# Patient Record
Sex: MALE | Race: WHITE | NOT HISPANIC OR LATINO | Employment: OTHER | URBAN - METROPOLITAN AREA
[De-identification: names, ages, dates, MRNs, and addresses within clinical notes are randomized per-mention and may not be internally consistent; named-entity substitution may affect disease eponyms.]

---

## 2018-04-12 ENCOUNTER — OFFICE VISIT (OUTPATIENT)
Dept: MEDICAL GROUP | Age: 53
End: 2018-04-12
Payer: COMMERCIAL

## 2018-04-12 ENCOUNTER — HOSPITAL ENCOUNTER (OUTPATIENT)
Dept: LAB | Facility: MEDICAL CENTER | Age: 53
End: 2018-04-12
Attending: FAMILY MEDICINE
Payer: COMMERCIAL

## 2018-04-12 VITALS
HEART RATE: 55 BPM | TEMPERATURE: 97 F | WEIGHT: 199 LBS | OXYGEN SATURATION: 99 % | BODY MASS INDEX: 28.49 KG/M2 | SYSTOLIC BLOOD PRESSURE: 133 MMHG | DIASTOLIC BLOOD PRESSURE: 98 MMHG | HEIGHT: 70 IN

## 2018-04-12 DIAGNOSIS — I10 ESSENTIAL HYPERTENSION: ICD-10-CM

## 2018-04-12 DIAGNOSIS — Z00.00 PREVENTATIVE HEALTH CARE: ICD-10-CM

## 2018-04-12 DIAGNOSIS — E78.00 PURE HYPERCHOLESTEROLEMIA: ICD-10-CM

## 2018-04-12 LAB
ALBUMIN SERPL BCP-MCNC: 4.1 G/DL (ref 3.2–4.9)
ALBUMIN/GLOB SERPL: 1.6 G/DL
ALP SERPL-CCNC: 30 U/L (ref 30–99)
ALT SERPL-CCNC: 18 U/L (ref 2–50)
ANION GAP SERPL CALC-SCNC: 6 MMOL/L (ref 0–11.9)
AST SERPL-CCNC: 20 U/L (ref 12–45)
BASOPHILS # BLD AUTO: 0.8 % (ref 0–1.8)
BASOPHILS # BLD: 0.04 K/UL (ref 0–0.12)
BILIRUB SERPL-MCNC: 0.6 MG/DL (ref 0.1–1.5)
BUN SERPL-MCNC: 17 MG/DL (ref 8–22)
CALCIUM SERPL-MCNC: 8.8 MG/DL (ref 8.5–10.5)
CHLORIDE SERPL-SCNC: 111 MMOL/L (ref 96–112)
CHOLEST SERPL-MCNC: 122 MG/DL (ref 100–199)
CO2 SERPL-SCNC: 24 MMOL/L (ref 20–33)
CREAT SERPL-MCNC: 1.1 MG/DL (ref 0.5–1.4)
EOSINOPHIL # BLD AUTO: 0.1 K/UL (ref 0–0.51)
EOSINOPHIL NFR BLD: 1.9 % (ref 0–6.9)
ERYTHROCYTE [DISTWIDTH] IN BLOOD BY AUTOMATED COUNT: 46.6 FL (ref 35.9–50)
GLOBULIN SER CALC-MCNC: 2.6 G/DL (ref 1.9–3.5)
GLUCOSE SERPL-MCNC: 111 MG/DL (ref 65–99)
HCT VFR BLD AUTO: 45.7 % (ref 42–52)
HDLC SERPL-MCNC: 60 MG/DL
HGB BLD-MCNC: 14.9 G/DL (ref 14–18)
IMM GRANULOCYTES # BLD AUTO: 0.01 K/UL (ref 0–0.11)
IMM GRANULOCYTES NFR BLD AUTO: 0.2 % (ref 0–0.9)
LDLC SERPL CALC-MCNC: 41 MG/DL
LYMPHOCYTES # BLD AUTO: 1.07 K/UL (ref 1–4.8)
LYMPHOCYTES NFR BLD: 20.2 % (ref 22–41)
MCH RBC QN AUTO: 31.2 PG (ref 27–33)
MCHC RBC AUTO-ENTMCNC: 32.6 G/DL (ref 33.7–35.3)
MCV RBC AUTO: 95.8 FL (ref 81.4–97.8)
MONOCYTES # BLD AUTO: 0.47 K/UL (ref 0–0.85)
MONOCYTES NFR BLD AUTO: 8.9 % (ref 0–13.4)
NEUTROPHILS # BLD AUTO: 3.62 K/UL (ref 1.82–7.42)
NEUTROPHILS NFR BLD: 68 % (ref 44–72)
NRBC # BLD AUTO: 0 K/UL
NRBC BLD-RTO: 0 /100 WBC
PLATELET # BLD AUTO: 191 K/UL (ref 164–446)
PMV BLD AUTO: 12 FL (ref 9–12.9)
POTASSIUM SERPL-SCNC: 3.9 MMOL/L (ref 3.6–5.5)
PROT SERPL-MCNC: 6.7 G/DL (ref 6–8.2)
RBC # BLD AUTO: 4.77 M/UL (ref 4.7–6.1)
SODIUM SERPL-SCNC: 141 MMOL/L (ref 135–145)
TRIGL SERPL-MCNC: 104 MG/DL (ref 0–149)
WBC # BLD AUTO: 5.3 K/UL (ref 4.8–10.8)

## 2018-04-12 PROCEDURE — 80061 LIPID PANEL: CPT

## 2018-04-12 PROCEDURE — 36415 COLL VENOUS BLD VENIPUNCTURE: CPT

## 2018-04-12 PROCEDURE — 80053 COMPREHEN METABOLIC PANEL: CPT

## 2018-04-12 PROCEDURE — 85025 COMPLETE CBC W/AUTO DIFF WBC: CPT

## 2018-04-12 PROCEDURE — 99203 OFFICE O/P NEW LOW 30 MIN: CPT | Performed by: FAMILY MEDICINE

## 2018-04-12 RX ORDER — ATORVASTATIN CALCIUM 40 MG/1
40 TABLET, FILM COATED ORAL DAILY
Qty: 90 TAB | Refills: 0 | Status: SHIPPED | OUTPATIENT
Start: 2018-04-12 | End: 2018-10-12

## 2018-04-12 RX ORDER — ATENOLOL 50 MG/1
50 TABLET ORAL DAILY
COMMUNITY
End: 2018-04-13 | Stop reason: SDUPTHER

## 2018-04-12 RX ORDER — FENOFIBRATE 145 MG/1
145 TABLET, COATED ORAL
Qty: 90 TAB | Refills: 0 | Status: SHIPPED | OUTPATIENT
Start: 2018-04-12 | End: 2018-07-09 | Stop reason: SDUPTHER

## 2018-04-12 RX ORDER — ATORVASTATIN CALCIUM 40 MG/1
40 TABLET, FILM COATED ORAL DAILY
Qty: 90 TAB | Refills: 0 | Status: SHIPPED | OUTPATIENT
Start: 2018-04-12 | End: 2018-04-12 | Stop reason: SDUPTHER

## 2018-04-12 RX ORDER — ATORVASTATIN CALCIUM 40 MG/1
40 TABLET, FILM COATED ORAL NIGHTLY
COMMUNITY
End: 2018-04-12 | Stop reason: SDUPTHER

## 2018-04-12 ASSESSMENT — PATIENT HEALTH QUESTIONNAIRE - PHQ9: CLINICAL INTERPRETATION OF PHQ2 SCORE: 0

## 2018-04-12 NOTE — ASSESSMENT & PLAN NOTE
Fenofibrate 145mg  Atorvastatin 40mg po q24h    The patient has been on a statin for years and tolerating this fine. The patient denies any muscle aches, no abdominal pain and no history of elevated liver enzymes.

## 2018-04-12 NOTE — ASSESSMENT & PLAN NOTE
The patient is a 53-year-old male presents to clinic to establish care. He has a significant past medical history of hypertension, hyperlipidemia. He takes fenofibrate, atorvastatin, atenolol, triamterene/Hctz however he's been out of these medications for at least 2 weeks.   The patient denied any chest pain, no sob, no qiu, no  pnd, no orthopnea, no headache, no changes in vision, no numbness or tingling, no nausea, no diarrhea, no abdominal pain, no fevers, no chills, no bright red blood per rectum, no  difficulty urinating, no burning during micturition, no depressed mood, no other concerns.    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

## 2018-04-12 NOTE — ASSESSMENT & PLAN NOTE
Has not had his medications for 2 wks  Triameterent/hctz 50/25mg po 24h  Atenolol 50mg po q24h          Patient has been diagnosed with essential hypertension for years. Has been compliant with medications and tolerating them with no mention of any adverse events.  The patient is NOT on a Baby ASPIRIN NOR a  STATIN.  The patient has been tollerating the BP meds without any issues,also monitors BP at home. No tunnel vision, no cough, no changes in vision, no lightheadedness, no fatigue, no syncopal or presyncopal episodes, no edema, no new rashes. Patient also  denied chest pain, headache, change in vision, dyspnea on exertion, shortness of breath, PND, back pain, numbness or tingling anywhere. He is tolerating his medication well, he denies any lightheadedness, no tunnel vision, no syncopal episodes, no fatigue, no leg weakness no falls.

## 2018-04-12 NOTE — PROGRESS NOTES
This medical record contains text that has been entered with the assistance of computer voice recognition and dictation software.  Therefore, it may contain unintended errors in text, spelling, punctuation, or grammar    Chief Complaint   Patient presents with   • Establish Care   • Hypertension       Sony Tenorio is a 53 y.o. male here evaluation and management of: establish care, htn, hld      HPI:     Preventative health care    The patient is a 53-year-old male presents to clinic to [facility]. He has a significant past medical history of hypertension, hyperlipidemia. He takes fenofibrate, atorvastatin, atenolol, triamterene/Hctz however he's been out of these medications for at least 2 weeks.   The patient denied any chest pain, no sob, no qiu, no  pnd, no orthopnea, no headache, no changes in vision, no numbness or tingling, no nausea, no diarrhea, no abdominal pain, no fevers, no chills, no bright red blood per rectum, no  difficulty urinating, no burning during micturition, no depressed mood, no other concerns.    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 Has been compliant with medications and tolerating them with no mention of any adverse events.  The patient is NOT on a Baby ASPIRIN NOR a  STATIN.  The patient has been tollerating the BP meds without any issues,also monitors BP at home. No tunnel vision, no cough, no changes in vision, no lightheadedness, no fatigue, no syncopal or presyncopal episodes, no edema, no new rashes. Patient also  denied chest pain, headache, change in vision, dyspnea on exertion, shortness of breath, PND, back pain, numbness or tingling anywhere. He is tolerating his medication well, he denies any  "lightheadedness, no tunnel vision, no syncopal episodes, no fatigue, no leg weakness no falls.    Pure hypercholesterolemia  Fenofibrate 145mg  Atorvastatin 40mg po q24h    The patient has been on a statin for years and tolerating this fine. The patient denies any muscle aches, no abdominal pain and no history of elevated liver enzymes.      Current medicines (including changes today)  Current Outpatient Prescriptions   Medication Sig Dispense Refill   • triamterene/hctz 50-25 MG Cap Take 1 Cap by mouth every morning. 90 Cap 0   • atorvastatin (LIPITOR) 40 MG Tab Take 1 Tab by mouth every day. 90 Tab 0   • fenofibrate (TRICOR) 145 MG Tab Take 1 Tab by mouth every bedtime. 90 Tab 0   • atenolol (TENORMIN) 50 MG Tab Take 50 mg by mouth every day.       No current facility-administered medications for this visit.      He  has no past medical history on file.  He  has no past surgical history on file.  Social History   Substance Use Topics   • Smoking status: Never Smoker   • Smokeless tobacco: Former User     Quit date: 2016   • Alcohol use Not on file     Social History     Social History Narrative   • No narrative on file     No family history on file.  No family status information on file.         ROS    Please see hpi     All other systems reviewed and are negative     Objective:     Blood pressure 133/98, pulse (!) 55, temperature 36.1 °C (97 °F), height 1.778 m (5' 10\"), weight 90.3 kg (199 lb), SpO2 99 %. Body mass index is 28.55 kg/m².  Physical Exam:    Constitutional: Alert, no distress.  Skin: Warm, dry, good turgor, no rashes in visible areas.  Eye: Equal, round and reactive, conjunctiva clear, lids normal.  ENMT: Lips without lesions, good dentition, oropharynx clear.  Neck: Trachea midline, no masses, no thyromegaly. No cervical or supraclavicular lymphadenopathy.  Respiratory: Unlabored respiratory effort, lungs clear to auscultation, no wheezes, no ronchi.  Cardiovascular: Normal S1, S2, no murmur, no " edema.  Abdomen: Soft, non-tender, no masses, no hepatosplenomegaly.  Psych: Alert and oriented x3, normal affect and mood.          Assessment and Plan:   The following treatment plan was discussed      1. Pure hypercholesterolemia  Need new labs  Then make adjustments    2. Essential hypertension    I explained to the patient that the most common cause of death in [place] in both Male and Females was Acute MI and the most common risk factor for MI was hypertension.  The Patient was counseled on aggressive life style modifications such as running at least 20minutes per day 3 times per wk, and decreasing Sodium intake,      In addition to pharmacotherapy we discussed  lifestyle modification…#1. Maintain normal weight (BMI 18.5 to 24.kg/m2), #2 DASH diet, #3 Decrease Sodium intake to less than 100meq/day (2.4g Na or 6g NaCL), #4 increase physical activity, #5 Limit Etoh consumption to 2 drinks/day in men and 1 drink per day in women.   - LIPID PROFILE; Future  - COMP METABOLIC PANEL; Future  - CBC WITH DIFFERENTIAL; Future    meds were ordered and sent to pharmacy    3. Preventative health care  Care has been established  We need baseline labs to establish a clinical profile  We will then consider daily prophylactic aspirin   In order to weigh  the benefits vs risk of GI bleed    We reviewed USPSTF guidelines  The USPSTF recommends initiating low-dose aspirin use for the primary prevention of cardiovascular disease (CVD) and colorectal cancer (CRC) in adults aged 50 to 59 years who have a 10% or greater 10-year CVD risk, are not at increased risk for bleeding, have a life expectancy of at least 10 years, and are willing to take low-dose aspirin daily for at least 10 years.        Up to date on colonoscopy   Requested Medical records to be sent to us  Denies intimate partner viloence            HEALTH MAINTENANCE:    Instructed to Follow up in clinic or ER for worsening symptoms, difficulty breathing, lack of expected  recovery, or should new symptoms or problems arise.    Followup: Return in about 2 weeks (around 4/26/2018) for 2 week BP log.       Once again this medical record contains text that has been entered with the assistance of computer voice recognition and dictation software.  Therefore, it may contain unintended errors in text, spelling, punctuation, or grammar

## 2018-04-17 RX ORDER — ATENOLOL 50 MG/1
50 TABLET ORAL DAILY
Qty: 90 TAB | Refills: 0 | Status: SHIPPED | OUTPATIENT
Start: 2018-04-17 | End: 2018-07-13 | Stop reason: SDUPTHER

## 2018-04-19 ENCOUNTER — TELEPHONE (OUTPATIENT)
Dept: MEDICAL GROUP | Age: 53
End: 2018-04-19

## 2018-04-19 NOTE — TELEPHONE ENCOUNTER
1. Caller Name: [facility]                                          Call Back Number: [phone]      Patient approves a detailed voicemail message: N\A    Pharmacy stated Pt has been taking a dose of Triamterene of 37.5-25mg.  During intake, Pt was unsure about dosage.

## 2018-07-13 RX ORDER — ATENOLOL 50 MG/1
50 TABLET ORAL DAILY
Qty: 90 TAB | Refills: 0 | Status: SHIPPED | OUTPATIENT
Start: 2018-07-13 | End: 2018-07-18 | Stop reason: SDUPTHER

## 2018-07-17 RX ORDER — TRIAMTERENE AND HYDROCHLOROTHIAZIDE 37.5; 25 MG/1; MG/1
CAPSULE ORAL
Qty: 90 CAP | Refills: 0 | Status: SHIPPED | OUTPATIENT
Start: 2018-07-17 | End: 2018-10-14 | Stop reason: SDUPTHER

## 2018-07-19 RX ORDER — ATENOLOL 50 MG/1
50 TABLET ORAL DAILY
Qty: 90 TAB | Refills: 0 | Status: SHIPPED | OUTPATIENT
Start: 2018-07-19 | End: 2019-01-10 | Stop reason: SDUPTHER

## 2018-10-12 ENCOUNTER — HOSPITAL ENCOUNTER (OUTPATIENT)
Dept: LAB | Facility: MEDICAL CENTER | Age: 53
End: 2018-10-12
Attending: FAMILY MEDICINE
Payer: COMMERCIAL

## 2018-10-12 ENCOUNTER — OFFICE VISIT (OUTPATIENT)
Dept: MEDICAL GROUP | Age: 53
End: 2018-10-12
Payer: COMMERCIAL

## 2018-10-12 VITALS
HEART RATE: 50 BPM | DIASTOLIC BLOOD PRESSURE: 78 MMHG | TEMPERATURE: 97.4 F | BODY MASS INDEX: 26.63 KG/M2 | WEIGHT: 186 LBS | OXYGEN SATURATION: 98 % | SYSTOLIC BLOOD PRESSURE: 136 MMHG | HEIGHT: 70 IN

## 2018-10-12 DIAGNOSIS — I10 ESSENTIAL HYPERTENSION: ICD-10-CM

## 2018-10-12 DIAGNOSIS — Z00.00 PREVENTATIVE HEALTH CARE: ICD-10-CM

## 2018-10-12 DIAGNOSIS — Z12.11 SCREENING FOR COLON CANCER: ICD-10-CM

## 2018-10-12 DIAGNOSIS — H61.23 BILATERAL IMPACTED CERUMEN: ICD-10-CM

## 2018-10-12 DIAGNOSIS — E78.00 PURE HYPERCHOLESTEROLEMIA: ICD-10-CM

## 2018-10-12 LAB
ALBUMIN SERPL BCP-MCNC: 4.5 G/DL (ref 3.2–4.9)
ALBUMIN/GLOB SERPL: 1.6 G/DL
ALP SERPL-CCNC: 29 U/L (ref 30–99)
ALT SERPL-CCNC: 22 U/L (ref 2–50)
ANION GAP SERPL CALC-SCNC: 8 MMOL/L (ref 0–11.9)
AST SERPL-CCNC: 26 U/L (ref 12–45)
BILIRUB SERPL-MCNC: 0.6 MG/DL (ref 0.1–1.5)
BUN SERPL-MCNC: 23 MG/DL (ref 8–22)
CALCIUM SERPL-MCNC: 9 MG/DL (ref 8.5–10.5)
CHLORIDE SERPL-SCNC: 110 MMOL/L (ref 96–112)
CHOLEST SERPL-MCNC: 146 MG/DL (ref 100–199)
CO2 SERPL-SCNC: 25 MMOL/L (ref 20–33)
CREAT SERPL-MCNC: 1.17 MG/DL (ref 0.5–1.4)
ERYTHROCYTE [DISTWIDTH] IN BLOOD BY AUTOMATED COUNT: 45.9 FL (ref 35.9–50)
FASTING STATUS PATIENT QL REPORTED: NORMAL
GLOBULIN SER CALC-MCNC: 2.8 G/DL (ref 1.9–3.5)
GLUCOSE SERPL-MCNC: 109 MG/DL (ref 65–99)
HCT VFR BLD AUTO: 48 % (ref 42–52)
HDLC SERPL-MCNC: 79 MG/DL
HGB BLD-MCNC: 15.6 G/DL (ref 14–18)
LDLC SERPL CALC-MCNC: 56 MG/DL
MCH RBC QN AUTO: 30.8 PG (ref 27–33)
MCHC RBC AUTO-ENTMCNC: 32.5 G/DL (ref 33.7–35.3)
MCV RBC AUTO: 94.7 FL (ref 81.4–97.8)
PLATELET # BLD AUTO: 189 K/UL (ref 164–446)
PMV BLD AUTO: 11.5 FL (ref 9–12.9)
POTASSIUM SERPL-SCNC: 3.6 MMOL/L (ref 3.6–5.5)
PROT SERPL-MCNC: 7.3 G/DL (ref 6–8.2)
RBC # BLD AUTO: 5.07 M/UL (ref 4.7–6.1)
SODIUM SERPL-SCNC: 143 MMOL/L (ref 135–145)
TRIGL SERPL-MCNC: 54 MG/DL (ref 0–149)
WBC # BLD AUTO: 5.3 K/UL (ref 4.8–10.8)

## 2018-10-12 PROCEDURE — 90471 IMMUNIZATION ADMIN: CPT | Performed by: FAMILY MEDICINE

## 2018-10-12 PROCEDURE — 85027 COMPLETE CBC AUTOMATED: CPT

## 2018-10-12 PROCEDURE — 90686 IIV4 VACC NO PRSV 0.5 ML IM: CPT | Performed by: FAMILY MEDICINE

## 2018-10-12 PROCEDURE — 80053 COMPREHEN METABOLIC PANEL: CPT

## 2018-10-12 PROCEDURE — 36415 COLL VENOUS BLD VENIPUNCTURE: CPT

## 2018-10-12 PROCEDURE — 99396 PREV VISIT EST AGE 40-64: CPT | Mod: 25 | Performed by: FAMILY MEDICINE

## 2018-10-12 PROCEDURE — 80061 LIPID PANEL: CPT

## 2018-10-12 NOTE — LETTER
[facility]  Dannie Mullins M.D.  [address] [place]   [place] [address]  Fax: [phone]   Authorization for Release/Disclosure of   Protected Health Information   Name: SONY TENORIO : 1965 SSN: [id number]   Address: [address] [place] [address]  [place] [address] Phone:    [phone] (home)    I authorize the entity listed below to release/disclose the PHI below to:   [facility]/Dannie Mullins M.D. and Dannie Mullins M.D.   Provider or Entity Name:  [facility]   Address   City, State, Zip   Phone:      Fax:     Reason for request: continuity of care   Information to be released:    [  ] LAST COLONOSCOPY,  including any PATH REPORT and follow-up  [  ] LAST FIT/COLOGUARD RESULT [  ] LAST DEXA  [  ] LAST MAMMOGRAM  [  ] LAST PAP  [  ] LAST LABS [  ] RETINA EXAM REPORT  [  ] IMMUNIZATION RECORDS  [  ] Release all info      [  ] Check here and initial the line next to each item to release ALL health information INCLUDING  _____ Care and treatment for drug and / or alcohol abuse  _____ HIV testing, infection status, or AIDS  _____ Genetic Testing    DATES OF SERVICE OR TIME PERIOD TO BE DISCLOSED: _____________  I understand and acknowledge that:  * This Authorization may be revoked at any time by you in writing, except if your health information has already been used or disclosed.  * Your health information that will be used or disclosed as a result of you signing this authorization could be re-disclosed by the recipient. If this occurs, your re-disclosed health information may no longer be protected by State or Federal laws.  * You may refuse to sign this Authorization. Your refusal will not affect your ability to obtain treatment.  * This Authorization becomes effective upon signing and will  on (date) __________.      If no date is indicated, this Authorization will  one (1) year from the signature date.    Name: Sony Tenorio    Signature:   Date:     10/12/2018       PLEASE FAX REQUESTED RECORDS BACK TO: [phone]

## 2018-10-12 NOTE — ASSESSMENT & PLAN NOTE
The patient is a very pleasant 53-year-old male who returns to clinic for routine follow-up.  He has a significant past medical history of hypertension hyperlipidemia currently is compliant with medications.  He has lost 13 pounds by increasing the mileage of bike riding.  The patient denied any chest pain, no sob, no qiu, no  pnd, no orthopnea, no headache, no changes in vision, no numbness or tingling, no nausea, no diarrhea, no abdominal pain, no fevers, no chills, no bright red blood per rectum, no  difficulty urinating, no burning during micturition, no depressed mood, no other concerns.

## 2018-10-12 NOTE — ASSESSMENT & PLAN NOTE
Atorvastatin 40 mg p.o. nightly  Fenofibrate 145 mg p.o. nightly    Patient denies any abdominal pain no history of elevated liver enzymes denies any muscle aches.  He is due for new labs.

## 2018-10-12 NOTE — ASSESSMENT & PLAN NOTE
Triamterene/HCTZ 37.5 mg / 25 mg p.o. Daily    The patient has been tollerating the BP meds without any issues. No tunnel vision, no cough, no changes in vision, no lightheadedness, no fatigue, no syncopal or presyncopal episodes, no edema, no new rashes.     The patient also denies chest pain, no dyspnea on exertion, no headaches, no numbness or tingling, no changes in vision, no weakness in any extremity, no back pain no changes in micturition.

## 2018-10-12 NOTE — PROGRESS NOTES
This medical record contains text that has been entered with the assistance of computer voice recognition and dictation software.  Therefore, it may contain unintended errors in text, spelling, punctuation, or grammar    Chief Complaint   Patient presents with   • Follow-Up   • Ear Fullness     right ear, x1 year         Sony Tenorio is a 53 y.o. male here evaluation and management of: Annual visit, ear fullness      HPI:     Essential hypertension  Triamterene/HCTZ 37.5 mg / 25 mg p.o. Daily    The patient has been tollerating the BP meds without any issues. No tunnel vision, no cough, no changes in vision, no lightheadedness, no fatigue, no syncopal or presyncopal episodes, no edema, no new rashes.     The patient also denies chest pain, no dyspnea on exertion, no headaches, no numbness or tingling, no changes in vision, no weakness in any extremity, no back pain no changes in micturition.          Bilateral impacted cerumen  NEW PROBLEM    Patient states she has been noticing some more right ear stuffiness lately.  Denies any hearing loss denies any tinnitus no discharge from the ear no fevers no chills no headaches no ear pain.    Pure hypercholesterolemia  Atorvastatin 40 mg p.o. nightly  Fenofibrate 145 mg p.o. nightly    Patient denies any abdominal pain no history of elevated liver enzymes denies any muscle aches.  He is due for new labs.    Screening for colon cancer  Patient states that he has completed his colonoscopy the records are pending.    Preventative health care  The patient is a very pleasant 53-year-old male who returns to clinic for routine follow-up.  He has a significant past medical history of hypertension hyperlipidemia currently is compliant with medications.  He has lost 13 pounds by increasing the mileage of bike riding.  The patient denied any chest pain, no sob, no qiu, no  pnd, no orthopnea, no headache, no changes in vision, no numbness or tingling, no nausea, no diarrhea, no  "abdominal pain, no fevers, no chills, no bright red blood per rectum, no  difficulty urinating, no burning during micturition, no depressed mood, no other concerns.                Current medicines (including changes today)  Current Outpatient Prescriptions   Medication Sig Dispense Refill   • aspirin EC (ECOTRIN) 81 MG Tablet Delayed Response Take 1 Tab by mouth every day. 365 Tab 1   • carbamide peroxide (CARBAMOXIDE EAR DROPS) 6.5 % Solution Place 6 Drops in ear 2 times a day. Administer drops in both ears. 1 Bottle 0   • atenolol (TENORMIN) 50 MG Tab TAKE 1 TAB BY MOUTH EVERY DAY. 90 Tab 0   • triamterene/hctz (MAXZIDE-25/DYAZIDE) 37.5-25 MG Cap TAKE ONE CAPSULE BY MOUTH EVERY MORNING 90 Cap 0   • atorvastatin (LIPITOR) 40 MG Tab TAKE 1 TAB BY MOUTH EVERY DAY. 90 Tab 0   • fenofibrate (TRICOR) 145 MG Tab TAKE 1 TAB BY MOUTH EVERY BEDTIME. 90 Tab 0     No current facility-administered medications for this visit.      He  has no past medical history on file.  He  has no past surgical history on file.  Social History   Substance Use Topics   • Smoking status: Never Smoker   • Smokeless tobacco: Former User     Quit date: 2016   • Alcohol use Not on file     Social History     Social History Narrative   • No narrative on file     No family history on file.  No family status information on file.         ROS    Please see hpi     All other systems reviewed and are negative     Objective:     Blood pressure 136/78, pulse (!) 50, temperature 36.3 °C (97.4 °F), temperature source Temporal, height 1.778 m (5' 10\"), weight 84.4 kg (186 lb), SpO2 98 %. Body mass index is 26.69 kg/m².  Physical Exam:    Constitutional: Alert, no distress.  Skin: Warm, dry, good turgor, no rashes in visible areas.  Eye: Equal, round and reactive, conjunctiva clear, lids normal.  ENMT: Lips without lesions, good dentition, oropharynx clear.  Neck: Trachea midline, no masses, no thyromegaly. No cervical or supraclavicular " lymphadenopathy.  Respiratory: Unlabored respiratory effort, lungs clear to auscultation, no wheezes, no ronchi.  Cardiovascular: Normal S1, S2, no murmur, no edema.  Abdomen: Soft, non-tender, no masses, no hepatosplenomegaly.  Psych: Alert and oriented x3, normal affect and mood.          Assessment and Plan:   The following treatment plan was discussed      1. Essential hypertension    Patient has been stable with current management  We will make no changes for now    - LIPID PROFILE; Future  - CBC WITHOUT DIFFERENTIAL; Future  - COMP METABOLIC PANEL; Future  - aspirin EC (ECOTRIN) 81 MG Tablet Delayed Response; Take 1 Tab by mouth every day.  Dispense: 365 Tab; Refill: 1  - Influenza Vaccine Quad Injection >3Y (PF)    2. Screening for colon cancer  Records pending      3. Bilateral impacted cerumen    - carbamide peroxide (CARBAMOXIDE EAR DROPS) 6.5 % Solution; Place 6 Drops in ear 2 times a day. Administer drops in both ears.  Dispense: 1 Bottle; Refill: 0    4. Pure hypercholesterolemia  Due for new labs    5. Preventative health care  Care has been established  We need baseline labs to establish a clinical profile  We will then consider daily prophylactic aspirin   In order to weigh  the benefits vs risk of GI bleed    We reviewed USPSTF guidelines  The USPSTF recommends initiating low-dose aspirin use for the primary prevention of cardiovascular disease (CVD) and colorectal cancer (CRC) in adults aged 50 to 59 years who have a 10% or greater 10-year CVD risk, are not at increased risk for bleeding, have a life expectancy of at least 10 years, and are willing to take low-dose aspirin daily for at least 10 years.          Up to date on colonoscopy               HEALTH MAINTENANCE:    Instructed to Follow up in clinic or ER for worsening symptoms, difficulty breathing, lack of expected recovery, or should new symptoms or problems arise.    Followup: Return in about 6 months (around 4/12/2019) for  Reevaluation.       Once again this medical record contains text that has been entered with the assistance of computer voice recognition and dictation software.  Therefore, it may contain unintended errors in text, spelling, punctuation, or grammar

## 2018-10-16 ENCOUNTER — TELEPHONE (OUTPATIENT)
Dept: MEDICAL GROUP | Age: 53
End: 2018-10-16

## 2018-10-16 RX ORDER — TRIAMTERENE AND HYDROCHLOROTHIAZIDE 37.5; 25 MG/1; MG/1
CAPSULE ORAL
Qty: 90 CAP | Refills: 0 | Status: SHIPPED | OUTPATIENT
Start: 2018-10-16 | End: 2019-01-10 | Stop reason: SDUPTHER

## 2018-10-16 RX ORDER — ATORVASTATIN CALCIUM 40 MG/1
TABLET, FILM COATED ORAL
Qty: 90 TAB | Refills: 0 | Status: SHIPPED | OUTPATIENT
Start: 2018-10-16 | End: 2019-01-10 | Stop reason: SDUPTHER

## 2018-10-16 RX ORDER — FENOFIBRATE 145 MG/1
TABLET, COATED ORAL
Qty: 90 TAB | Refills: 0 | Status: SHIPPED | OUTPATIENT
Start: 2018-10-16 | End: 2019-01-14 | Stop reason: SDUPTHER

## 2018-10-18 NOTE — TELEPHONE ENCOUNTER
1. Caller Name:  [id number] - [place] - [address] [place] [address]  Phone: [phone] Fax: [phone]    Pharmacy recommends OTC Debrox solution

## 2019-01-10 RX ORDER — TRIAMTERENE AND HYDROCHLOROTHIAZIDE 37.5; 25 MG/1; MG/1
CAPSULE ORAL
Qty: 90 CAP | Refills: 0 | Status: SHIPPED | OUTPATIENT
Start: 2019-01-10 | End: 2019-04-10 | Stop reason: SDUPTHER

## 2019-01-10 RX ORDER — ATENOLOL 50 MG/1
50 TABLET ORAL DAILY
Qty: 90 TAB | Refills: 0 | Status: SHIPPED | OUTPATIENT
Start: 2019-01-10 | End: 2019-04-15 | Stop reason: SDUPTHER

## 2019-01-10 RX ORDER — ATORVASTATIN CALCIUM 40 MG/1
40 TABLET, FILM COATED ORAL
Qty: 90 TAB | Refills: 0 | Status: SHIPPED | OUTPATIENT
Start: 2019-01-10 | End: 2019-04-15 | Stop reason: SDUPTHER

## 2019-01-15 RX ORDER — FENOFIBRATE 145 MG/1
TABLET, COATED ORAL
Qty: 90 TAB | Refills: 0 | Status: SHIPPED | OUTPATIENT
Start: 2019-01-15 | End: 2019-04-10 | Stop reason: SDUPTHER

## 2019-04-11 ENCOUNTER — HOSPITAL ENCOUNTER (OUTPATIENT)
Dept: LAB | Facility: MEDICAL CENTER | Age: 54
End: 2019-04-11
Attending: FAMILY MEDICINE
Payer: COMMERCIAL

## 2019-04-11 ENCOUNTER — OFFICE VISIT (OUTPATIENT)
Dept: MEDICAL GROUP | Age: 54
End: 2019-04-11
Payer: COMMERCIAL

## 2019-04-11 VITALS
OXYGEN SATURATION: 91 % | HEIGHT: 70 IN | SYSTOLIC BLOOD PRESSURE: 118 MMHG | DIASTOLIC BLOOD PRESSURE: 80 MMHG | WEIGHT: 188.4 LBS | TEMPERATURE: 96.7 F | BODY MASS INDEX: 26.97 KG/M2 | HEART RATE: 53 BPM

## 2019-04-11 DIAGNOSIS — E78.00 PURE HYPERCHOLESTEROLEMIA: ICD-10-CM

## 2019-04-11 DIAGNOSIS — Z00.00 PREVENTATIVE HEALTH CARE: ICD-10-CM

## 2019-04-11 DIAGNOSIS — I10 ESSENTIAL HYPERTENSION: ICD-10-CM

## 2019-04-11 LAB
ALBUMIN SERPL BCP-MCNC: 4.4 G/DL (ref 3.2–4.9)
ALBUMIN/GLOB SERPL: 1.8 G/DL
ALP SERPL-CCNC: 24 U/L (ref 30–99)
ALT SERPL-CCNC: 20 U/L (ref 2–50)
ANION GAP SERPL CALC-SCNC: 4 MMOL/L (ref 0–11.9)
AST SERPL-CCNC: 28 U/L (ref 12–45)
BILIRUB SERPL-MCNC: 0.5 MG/DL (ref 0.1–1.5)
BUN SERPL-MCNC: 22 MG/DL (ref 8–22)
CALCIUM SERPL-MCNC: 9 MG/DL (ref 8.5–10.5)
CHLORIDE SERPL-SCNC: 107 MMOL/L (ref 96–112)
CHOLEST SERPL-MCNC: 138 MG/DL (ref 100–199)
CO2 SERPL-SCNC: 26 MMOL/L (ref 20–33)
CREAT SERPL-MCNC: 1.07 MG/DL (ref 0.5–1.4)
ERYTHROCYTE [DISTWIDTH] IN BLOOD BY AUTOMATED COUNT: 47.1 FL (ref 35.9–50)
FASTING STATUS PATIENT QL REPORTED: NORMAL
GLOBULIN SER CALC-MCNC: 2.5 G/DL (ref 1.9–3.5)
GLUCOSE SERPL-MCNC: 113 MG/DL (ref 65–99)
HCT VFR BLD AUTO: 47.4 % (ref 42–52)
HDLC SERPL-MCNC: 78 MG/DL
HGB BLD-MCNC: 15.2 G/DL (ref 14–18)
LDLC SERPL CALC-MCNC: 50 MG/DL
MCH RBC QN AUTO: 31.5 PG (ref 27–33)
MCHC RBC AUTO-ENTMCNC: 32.1 G/DL (ref 33.7–35.3)
MCV RBC AUTO: 98.1 FL (ref 81.4–97.8)
PLATELET # BLD AUTO: 193 K/UL (ref 164–446)
PMV BLD AUTO: 11.3 FL (ref 9–12.9)
POTASSIUM SERPL-SCNC: 3.8 MMOL/L (ref 3.6–5.5)
PROT SERPL-MCNC: 6.9 G/DL (ref 6–8.2)
RBC # BLD AUTO: 4.83 M/UL (ref 4.7–6.1)
SODIUM SERPL-SCNC: 137 MMOL/L (ref 135–145)
TRIGL SERPL-MCNC: 51 MG/DL (ref 0–149)
WBC # BLD AUTO: 4.6 K/UL (ref 4.8–10.8)

## 2019-04-11 PROCEDURE — 80053 COMPREHEN METABOLIC PANEL: CPT

## 2019-04-11 PROCEDURE — 80061 LIPID PANEL: CPT

## 2019-04-11 PROCEDURE — 99213 OFFICE O/P EST LOW 20 MIN: CPT | Performed by: FAMILY MEDICINE

## 2019-04-11 PROCEDURE — 36415 COLL VENOUS BLD VENIPUNCTURE: CPT

## 2019-04-11 PROCEDURE — 85027 COMPLETE CBC AUTOMATED: CPT

## 2019-04-11 RX ORDER — FENOFIBRATE 145 MG/1
TABLET, COATED ORAL
Qty: 90 TAB | Refills: 0 | Status: SHIPPED | OUTPATIENT
Start: 2019-04-11 | End: 2019-07-10 | Stop reason: SDUPTHER

## 2019-04-11 RX ORDER — TRIAMTERENE AND HYDROCHLOROTHIAZIDE 37.5; 25 MG/1; MG/1
CAPSULE ORAL
Qty: 90 CAP | Refills: 0 | Status: SHIPPED | OUTPATIENT
Start: 2019-04-11 | End: 2019-07-10 | Stop reason: SDUPTHER

## 2019-04-11 NOTE — ASSESSMENT & PLAN NOTE
The patient has been compliant with his hypertensive regimen of triamterene/Hydrocort thiazide 37.5/25 mg p.o. daily  As well as atenolol 50 mg p.o. Daily    Home BP readings--- not monitoring    The patient has been tollerating the BP meds without any issues. No tunnel vision, no cough, no changes in vision, no lightheadedness, no fatigue, no syncopal or presyncopal episodes, no edema, no new rashes.     The patient also denies chest pain, no dyspnea on exertion, no headaches, no numbness or tingling, no changes in vision, no weakness in any extremity, no back pain no changes in micturition.

## 2019-04-11 NOTE — PROGRESS NOTES
This medical record contains text that has been entered with the assistance of computer voice recognition and dictation software.  Therefore, it may contain unintended errors in text, spelling, punctuation, or grammar    Chief Complaint   Patient presents with   • Hyperlipidemia     lab review         Sony Tenorio is a 54 y.o. male here evaluation and management of: routine      HPI:     Essential hypertension  The patient has been compliant with his hypertensive regimen of triamterene/Hydrocort thiazide 37.5/25 mg p.o. daily  As well as atenolol 50 mg p.o. Daily    Home BP readings--- not monitoring    The patient has been tollerating the BP meds without any issues. No tunnel vision, no cough, no changes in vision, no lightheadedness, no fatigue, no syncopal or presyncopal episodes, no edema, no new rashes.     The patient also denies chest pain, no dyspnea on exertion, no headaches, no numbness or tingling, no changes in vision, no weakness in any extremity, no back pain no changes in micturition.        Pure hypercholesterolemia  Atorvastatin 40 mg nightly  Fenofibrate 145 mg p.o. nightly    Overall has been tolerating these medications for years.  Denies any abdominal pain no nausea no vomiting no history of elevated liver enzymes.  No history of muscle aches.  He is due for new labs.    Preventative health care  The patient is a very pleasant 54-year-old male who presents to clinic for his annual wellness visit.  He has a significant past medical history of hyperlipidemia hypertension.  He is a very healthy male who bicycles 25-30 miles per day. He is the owner of a coffee shop. The patient denied any chest pain, no sob, no qiu, no  pnd, no orthopnea, no headache, no changes in vision, no numbness or tingling, no nausea, no diarrhea, no abdominal pain, no fevers, no chills, no bright red blood per rectum, no  difficulty urinating, no burning during micturition, no depressed mood, no other concerns.      Current  "medicines (including changes today)  Current Outpatient Prescriptions   Medication Sig Dispense Refill   • fenofibrate (TRICOR) 145 MG Tab TAKE 1 TABLET BY MOUTH EVERYDAY AT BEDTIME 90 Tab 0   • triamterene/hctz (MAXZIDE-25/DYAZIDE) 37.5-25 MG Cap TAKE 1 CAPSULE BY MOUTH EVERY DAY IN THE MORNING 90 Cap 0   • atenolol (TENORMIN) 50 MG Tab Take 1 Tab by mouth every day. 90 Tab 0   • atorvastatin (LIPITOR) 40 MG Tab Take 1 Tab by mouth every day. 90 Tab 0   • aspirin EC (ECOTRIN) 81 MG Tablet Delayed Response Take 1 Tab by mouth every day. 365 Tab 1   • carbamide peroxide (CARBAMOXIDE EAR DROPS) 6.5 % Solution Place 6 Drops in ear 2 times a day. Administer drops in both ears. 1 Bottle 0   • carbamide peroxide (CARBAMOXIDE EAR DROPS) 6.5 % Solution Place 6 Drops in ear 2 times a day. Administer drops in both ears. 1 Bottle 0     No current facility-administered medications for this visit.      He  has no past medical history on file.  He  has no past surgical history on file.  Social History   Substance Use Topics   • Smoking status: Never Smoker   • Smokeless tobacco: Former User     Quit date: 2016   • Alcohol use Not on file     Social History     Social History Narrative   • No narrative on file     History reviewed. No pertinent family history.  No family status information on file.         ROS    Please see hpi     All other systems reviewed and are negative     Objective:     /80 (BP Location: Left arm, Patient Position: Sitting, BP Cuff Size: Adult)   Pulse (!) 53   Temp 35.9 °C (96.7 °F) (Temporal)   Ht 1.778 m (5' 10\")   Wt 85.5 kg (188 lb 6.4 oz)   SpO2 91%  Body mass index is 27.03 kg/m².  Physical Exam:    Constitutional: Alert, no distress.  Skin: Warm, dry, good turgor, no rashes in visible areas  Eye: Equal, round and reactive, conjunctiva clear, lids normal.  ENMT: Lips without lesions, good dentition, oropharynx clear.  Neck: Trachea midline, no masses, no thyromegaly. No cervical or " supraclavicular lymphadenopathy.  Respiratory: Unlabored respiratory effort, lungs clear to auscultation, no wheezes, no ronchi.  Cardiovascular: Normal S1, S2, no murmur, no edema  Abdomen: Soft, non-tender, no masses, no hepatosplenomegaly.  Psych: Alert and oriented x3, normal affect and mood.          Assessment and Plan:   The following treatment plan was discussed      1. Essential hypertension  Patient has been stable with current management  We will make no changes for now      2. Pure hypercholesterolemia  We will obtain new labs to update clinical profile.  Then we will adjust therapy as needed.    - CBC WITHOUT DIFFERENTIAL; Future  - Comp Metabolic Panel; Future  - Lipid Profile; Future    3. Preventative health care  Due for Shingrix      Instructed to Follow up in clinic or ER for worsening symptoms, difficulty breathing, lack of expected recovery, or should new symptoms or problems arise.    Followup: Return in about 1 year (around 4/11/2020) for Reevaluation, labs.       Once again this medical record contains text that has been entered with the assistance of computer voice recognition and dictation software.  Therefore, it may contain unintended errors in text, spelling, punctuation, or grammar

## 2019-04-11 NOTE — ASSESSMENT & PLAN NOTE
Atorvastatin 40 mg nightly  Fenofibrate 145 mg p.o. nightly    Overall has been tolerating these medications for years.  Denies any abdominal pain no nausea no vomiting no history of elevated liver enzymes.  No history of muscle aches.  He is due for new labs.
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
## 2019-07-10 RX ORDER — TRIAMTERENE AND HYDROCHLOROTHIAZIDE 37.5; 25 MG/1; MG/1
CAPSULE ORAL
Qty: 100 CAP | Refills: 2 | Status: SHIPPED | OUTPATIENT
Start: 2019-07-10 | End: 2020-07-01

## 2019-07-10 RX ORDER — FENOFIBRATE 145 MG/1
TABLET, COATED ORAL
Qty: 100 TAB | Refills: 2 | Status: SHIPPED | OUTPATIENT
Start: 2019-07-10 | End: 2020-07-01

## 2019-10-07 DIAGNOSIS — I10 ESSENTIAL HYPERTENSION: ICD-10-CM

## 2019-10-08 RX ORDER — ASPIRIN 81 MG/1
TABLET ORAL
Qty: 365 TAB | Refills: 1 | Status: SHIPPED | OUTPATIENT
Start: 2019-10-08

## 2019-12-27 RX ORDER — ATENOLOL 50 MG/1
TABLET ORAL
Qty: 90 TAB | Refills: 0 | Status: SHIPPED | OUTPATIENT
Start: 2019-12-27 | End: 2020-04-01

## 2019-12-27 RX ORDER — ATORVASTATIN CALCIUM 40 MG/1
TABLET, FILM COATED ORAL
Qty: 90 TAB | Refills: 0 | Status: SHIPPED | OUTPATIENT
Start: 2019-12-27 | End: 2020-04-01

## 2020-02-11 ENCOUNTER — OFFICE VISIT (OUTPATIENT)
Dept: MEDICAL GROUP | Age: 55
End: 2020-02-11
Payer: COMMERCIAL

## 2020-02-11 ENCOUNTER — HOSPITAL ENCOUNTER (OUTPATIENT)
Dept: LAB | Facility: MEDICAL CENTER | Age: 55
End: 2020-02-11
Attending: FAMILY MEDICINE
Payer: COMMERCIAL

## 2020-02-11 VITALS
OXYGEN SATURATION: 98 % | HEIGHT: 70 IN | WEIGHT: 189 LBS | DIASTOLIC BLOOD PRESSURE: 82 MMHG | SYSTOLIC BLOOD PRESSURE: 124 MMHG | BODY MASS INDEX: 27.06 KG/M2 | TEMPERATURE: 97.5 F | HEART RATE: 53 BPM

## 2020-02-11 DIAGNOSIS — I10 ESSENTIAL HYPERTENSION: ICD-10-CM

## 2020-02-11 DIAGNOSIS — Z00.00 ANNUAL PHYSICAL EXAM: ICD-10-CM

## 2020-02-11 DIAGNOSIS — E78.00 PURE HYPERCHOLESTEROLEMIA: ICD-10-CM

## 2020-02-11 LAB
ALBUMIN SERPL BCP-MCNC: 4.9 G/DL (ref 3.2–4.9)
ALBUMIN/GLOB SERPL: 1.8 G/DL
ALP SERPL-CCNC: 28 U/L (ref 30–99)
ALT SERPL-CCNC: 22 U/L (ref 2–50)
ANION GAP SERPL CALC-SCNC: 7 MMOL/L (ref 0–11.9)
AST SERPL-CCNC: 26 U/L (ref 12–45)
BILIRUB SERPL-MCNC: 0.7 MG/DL (ref 0.1–1.5)
BUN SERPL-MCNC: 19 MG/DL (ref 8–22)
CALCIUM SERPL-MCNC: 9.2 MG/DL (ref 8.5–10.5)
CHLORIDE SERPL-SCNC: 106 MMOL/L (ref 96–112)
CHOLEST SERPL-MCNC: 165 MG/DL (ref 100–199)
CO2 SERPL-SCNC: 26 MMOL/L (ref 20–33)
CREAT SERPL-MCNC: 1.24 MG/DL (ref 0.5–1.4)
ERYTHROCYTE [DISTWIDTH] IN BLOOD BY AUTOMATED COUNT: 48.3 FL (ref 35.9–50)
FASTING STATUS PATIENT QL REPORTED: NORMAL
GLOBULIN SER CALC-MCNC: 2.7 G/DL (ref 1.9–3.5)
GLUCOSE SERPL-MCNC: 136 MG/DL (ref 65–99)
HCT VFR BLD AUTO: 48.5 % (ref 42–52)
HDLC SERPL-MCNC: 77 MG/DL
HGB BLD-MCNC: 15.9 G/DL (ref 14–18)
LDLC SERPL CALC-MCNC: 62 MG/DL
MCH RBC QN AUTO: 32 PG (ref 27–33)
MCHC RBC AUTO-ENTMCNC: 32.8 G/DL (ref 33.7–35.3)
MCV RBC AUTO: 97.6 FL (ref 81.4–97.8)
PLATELET # BLD AUTO: 211 K/UL (ref 164–446)
PMV BLD AUTO: 11.4 FL (ref 9–12.9)
POTASSIUM SERPL-SCNC: 4 MMOL/L (ref 3.6–5.5)
PROT SERPL-MCNC: 7.6 G/DL (ref 6–8.2)
RBC # BLD AUTO: 4.97 M/UL (ref 4.7–6.1)
SODIUM SERPL-SCNC: 139 MMOL/L (ref 135–145)
TRIGL SERPL-MCNC: 131 MG/DL (ref 0–149)
WBC # BLD AUTO: 4.7 K/UL (ref 4.8–10.8)

## 2020-02-11 PROCEDURE — 36415 COLL VENOUS BLD VENIPUNCTURE: CPT

## 2020-02-11 PROCEDURE — 85027 COMPLETE CBC AUTOMATED: CPT

## 2020-02-11 PROCEDURE — 80053 COMPREHEN METABOLIC PANEL: CPT

## 2020-02-11 PROCEDURE — 80061 LIPID PANEL: CPT

## 2020-02-11 PROCEDURE — 99396 PREV VISIT EST AGE 40-64: CPT | Performed by: FAMILY MEDICINE

## 2020-02-11 ASSESSMENT — PATIENT HEALTH QUESTIONNAIRE - PHQ9: CLINICAL INTERPRETATION OF PHQ2 SCORE: 0

## 2020-02-11 NOTE — PROGRESS NOTES
This medical record contains text that has been entered with the assistance of computer voice recognition and dictation software. Therefore, it may contain unintended errors in text, spelling, punctuation or grammar.    Chief Complaint   Patient presents with   • Hyperlipidemia     lab review   • Annual Exam       HPI:   Sony Tenorio is a 55 y.o. male here evaluation and management of:     Annual physical exam  The patient is a 55-year-old male who returns to clinic for his annual wellness visit.  He has a significant past medical history of hypertension and hyperlipidemia.  He is very active riding his bike 25 to 30 miles a week.  Today, the patient denied any recent illness, cough, chest pain, shortness of breath, abdominal pain, bowel changes, hematochezia, melena or urinary symptoms.     Past medical history--- Patient has a known history of hypercholesterolemia and hypertension.    Family History of Cancer--- Denies.    Family History of CAD--- Denies.      Social History  Exercise--- Patient regularly exercises and cycles.    Colonoscopy--- Colonoscopy completed in 04/2016 with Dr. Verdin, [facility]. Diverticulosis in the sigmoid colon.         Essential hypertension  Chronic history.    Patient has a history of chronic hypertension and is taking Atenolol 50 mg once daily and Triamterene-Hydrochlorothiazide 37.5-25 mg once daily. No medication side effects were reported. He reportedly monitors his blood pressure regularly at home. Blood pressure is stable today at 124/82.  He reports following a low sodium diet and denies any related complaints including chronic cough, chest pain, headaches or dizziness.     Pure hypercholesterolemia  Chronic history.    Currently managed via dietary and lifestyle medications. Additionally, he is taking Atorvastatin 40 mg once daily, Tricor 145 mg once daily and Aspirin 81 mg daily. No medication side effects or acute complaints of myalgias, abdominal pain,  dizziness, claudication or chest pain. Lipid panel was last completed on 04/11/19 as shown below.     I reviewed and discussed the following labs with the patient.  Results for TODD LUO (MRN [id number]) as of 2/11/2020 08:27   Ref. Range 4/11/2019 08:43   Cholesterol,Tot Latest Ref Range: 100 - 199 mg/dL 138   Triglycerides Latest Ref Range: 0 - 149 mg/dL 51   HDL Latest Ref Range: >=40 mg/dL 78   LDL Latest Ref Range: <100 mg/dL 50     Current medicines (including changes today)  Current Outpatient Medications   Medication Sig Dispense Refill   • atorvastatin (LIPITOR) 40 MG Tab TAKE 1 TABLET BY MOUTH EVERY DAY 90 Tab 0   • atenolol (TENORMIN) 50 MG Tab TAKE 1 TABLET BY MOUTH EVERY DAY 90 Tab 0   • aspirin 81 MG EC tablet TAKE 1 TABLET BY MOUTH EVERY  Tab 1   • fenofibrate (TRICOR) 145 MG Tab TAKE 1 TABLET BY MOUTH EVERYDAY AT BEDTIME 100 Tab 2   • triamterene/hctz (MAXZIDE-25/DYAZIDE) 37.5-25 MG Cap TAKE 1 CAPSULE BY MOUTH EVERY DAY IN THE MORNING 100 Cap 2   • carbamide peroxide (CARBAMOXIDE EAR DROPS) 6.5 % Solution Place 6 Drops in ear 2 times a day. Administer drops in both ears. 1 Bottle 0   • carbamide peroxide (CARBAMOXIDE EAR DROPS) 6.5 % Solution Place 6 Drops in ear 2 times a day. Administer drops in both ears. (Patient not taking: Reported on 2/11/2020) 1 Bottle 0     No current facility-administered medications for this visit.      He  has no past medical history on file.  He  has no past surgical history on file.  Social History     Tobacco Use   • Smoking status: Never Smoker   • Smokeless tobacco: Former User   Substance Use Topics   • Alcohol use: Not on file   • Drug use: Yes     Types: Marijuana     Comment: occasionally     Social History     Patient does not qualify to have social determinant information on file (likely too young).   Social History Narrative   • Not on file     History reviewed. No pertinent family history.  No family status information on file.       ROS    Please see  "HPI for further details.     All other systems reviewed and are negative.     Objective:     /82 (BP Location: Left arm, Patient Position: Sitting, BP Cuff Size: Adult)   Pulse (!) 53   Temp 36.4 °C (97.5 °F) (Temporal)   Ht 1.778 m (5' 10\")   Wt 85.7 kg (189 lb)   SpO2 98%  Body mass index is 27.12 kg/m².    Physical Exam:    Constitutional: Alert, no distress.  Skin: Warm, dry, good turgor, no rashes in visible areas.  Eye: Equal, round and reactive, conjunctiva clear, lids normal.  ENMT: Lips without lesions, good dentition, oropharynx clear.  Neck: Trachea midline, no masses, no thyromegaly. No cervical or supraclavicular lymphadenopathy.  Respiratory: Unlabored respiratory effort, lungs clear to auscultation, no wheezes, no ronchi.  Cardiovascular: Normal S1, S2, no murmur, no edema.  Psych: Alert and oriented x3, normal affect and mood.      Assessment and Plan:   The following treatment plan was discussed:    1. Annual physical exam    This is a healthy 55 year old male here today for a preventative exam.  Previous medical history, healthcare maintenance and immunization status reviewed.  Patient is up to date. Annual lab work ordered. See discussion of anticipatory guidance below. Patient will return annually for preventative exams.    - Lipid Profile; Future  - CBC WITHOUT DIFFERENTIAL; Future  - Comp Metabolic Panel; Future    2. Essential hypertension    Chronic, stable history. Under good control with current medication regimen: Atenolol 50 mg once daily and Triamterene-Hydrochlorothiazide 37.5-25 mg once daily. No changes in dosage today. Blood pressure today was stable at 124/82. Patient was asked to continue monitoring his blood pressure at home. He was encouraged to follow a low sodium diet.     3. Pure hypercholesterolemia    Well-controlled. Continue current regimen of Atorvastatin 40 mg once daily, Tricor 145 mg once daily and Aspirin 81 mg daily. Reviewed the risks and benefits of " treatment and potential side effects of medication.  - Obtained and reviewed lipid panel dated 04/11/19 which was within normal limits.  - Recommended he follow low fat, low carbohydrate and high fiber diet. Additionally, patient was asked to exercise regularly including frequent cardio.    HEALTH MAINTENANCE: Patient declined the zoster vaccine.    Instructed to follow up in clinic or ER for worsening symptoms, difficulty breathing, lack of expected recovery or should new symptoms or problems arise.    Follow up: Return in about 1 year (around 2/11/2021) for Reevaluation, labs.      Once again this medical record contains text that has been entered with the assistance of computer voice recognition, dictation software and medical scribes. Therefore, it may contain unintended errors in text, spelling, punctuation or grammar.    IMedina (Scribe), am scribing for, and in the presence of, Dannie Mckeon M.D.    Electronically signed by: Medina Benitez (Scribe), 2/11/2020     IDannie M.D. personally performed the services described in this documentation, as scribed by Medina Benitez in my presence, and it is both accurate and complete.

## 2020-04-01 RX ORDER — ATORVASTATIN CALCIUM 40 MG/1
TABLET, FILM COATED ORAL
Qty: 90 TAB | Refills: 0 | Status: SHIPPED | OUTPATIENT
Start: 2020-04-01 | End: 2020-06-30

## 2020-04-01 RX ORDER — ATENOLOL 50 MG/1
TABLET ORAL
Qty: 90 TAB | Refills: 0 | Status: SHIPPED | OUTPATIENT
Start: 2020-04-01 | End: 2020-06-30

## 2020-06-27 ENCOUNTER — HOSPITAL ENCOUNTER (EMERGENCY)
Facility: MEDICAL CENTER | Age: 55
End: 2020-06-27
Attending: EMERGENCY MEDICINE
Payer: COMMERCIAL

## 2020-06-27 VITALS
OXYGEN SATURATION: 97 % | RESPIRATION RATE: 16 BRPM | SYSTOLIC BLOOD PRESSURE: 160 MMHG | DIASTOLIC BLOOD PRESSURE: 80 MMHG | BODY MASS INDEX: 26.48 KG/M2 | TEMPERATURE: 98.1 F | WEIGHT: 185 LBS | HEART RATE: 60 BPM | HEIGHT: 70 IN
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
## 2020-06-27 DIAGNOSIS — S81.812A LACERATION OF LEFT LOWER LEG, INITIAL ENCOUNTER: ICD-10-CM

## 2020-06-27 PROCEDURE — 700102 HCHG RX REV CODE 250 W/ 637 OVERRIDE(OP): Performed by: EMERGENCY MEDICINE

## 2020-06-27 PROCEDURE — 90715 TDAP VACCINE 7 YRS/> IM: CPT | Performed by: EMERGENCY MEDICINE

## 2020-06-27 PROCEDURE — A9270 NON-COVERED ITEM OR SERVICE: HCPCS | Performed by: EMERGENCY MEDICINE

## 2020-06-27 PROCEDURE — 304217 HCHG IRRIGATION SYSTEM

## 2020-06-27 PROCEDURE — 99283 EMERGENCY DEPT VISIT LOW MDM: CPT

## 2020-06-27 PROCEDURE — 304999 HCHG REPAIR-SIMPLE/INTERMED LEVEL 1

## 2020-06-27 PROCEDURE — 700101 HCHG RX REV CODE 250: Performed by: EMERGENCY MEDICINE

## 2020-06-27 PROCEDURE — 700111 HCHG RX REV CODE 636 W/ 250 OVERRIDE (IP): Performed by: EMERGENCY MEDICINE

## 2020-06-27 PROCEDURE — 90471 IMMUNIZATION ADMIN: CPT

## 2020-06-27 PROCEDURE — 303747 HCHG EXTRA SUTURE

## 2020-06-27 RX ORDER — CEPHALEXIN 500 MG/1
500 CAPSULE ORAL ONCE
Status: COMPLETED | OUTPATIENT
Start: 2020-06-27 | End: 2020-06-27

## 2020-06-27 RX ORDER — IBUPROFEN 800 MG/1
800 TABLET ORAL EVERY 8 HOURS PRN
Qty: 30 TAB | Refills: 0 | Status: SHIPPED | OUTPATIENT
Start: 2020-06-27 | End: 2021-10-04

## 2020-06-27 RX ORDER — LIDOCAINE HYDROCHLORIDE 10 MG/ML
20 INJECTION, SOLUTION INFILTRATION; PERINEURAL ONCE
Status: COMPLETED | OUTPATIENT
Start: 2020-06-27 | End: 2020-06-27

## 2020-06-27 RX ORDER — CEPHALEXIN 500 MG/1
500 CAPSULE ORAL 4 TIMES DAILY
Qty: 40 CAP | Refills: 0 | Status: SHIPPED | OUTPATIENT
Start: 2020-06-27 | End: 2020-07-13 | Stop reason: SDUPTHER

## 2020-06-27 RX ADMIN — LIDOCAINE HYDROCHLORIDE 20 ML: 10 INJECTION, SOLUTION INFILTRATION; PERINEURAL at 22:15

## 2020-06-27 RX ADMIN — CLOSTRIDIUM TETANI TOXOID ANTIGEN (FORMALDEHYDE INACTIVATED), CORYNEBACTERIUM DIPHTHERIAE TOXOID ANTIGEN (FORMALDEHYDE INACTIVATED), BORDETELLA PERTUSSIS TOXOID ANTIGEN (GLUTARALDEHYDE INACTIVATED), BORDETELLA PERTUSSIS FILAMENTOUS HEMAGGLUTININ ANTIGEN (FORMALDEHYDE INACTIVATED), BORDETELLA PERTUSSIS PERTACTIN ANTIGEN, AND BORDETELLA PERTUSSIS FIMBRIAE 2/3 ANTIGEN 0.5 ML: 5; 2; 2.5; 5; 3; 5 INJECTION, SUSPENSION INTRAMUSCULAR at 22:46

## 2020-06-27 RX ADMIN — CEPHALEXIN 500 MG: 500 CAPSULE ORAL at 22:46

## 2020-06-27 ASSESSMENT — FIBROSIS 4 INDEX: FIB4 SCORE: 1.42

## 2020-06-28 NOTE — ED NOTES
Pt ambulated to green 37.  Agree with triage note.  Pt in gown and placed on monitors.  ERP to see.
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
## 2020-06-28 NOTE — ED NOTES
Discharge instructions given and discussed, signed copy in chart. Pt verbalized understanding and all questions answered.  Pt discharged home in stable condition. Personal belongings with patient.

## 2020-06-30 RX ORDER — ATORVASTATIN CALCIUM 40 MG/1
TABLET, FILM COATED ORAL
Qty: 90 TAB | Refills: 0 | Status: SHIPPED | OUTPATIENT
Start: 2020-06-30 | End: 2020-09-30

## 2020-06-30 RX ORDER — ATENOLOL 50 MG/1
TABLET ORAL
Qty: 90 TAB | Refills: 0 | Status: SHIPPED | OUTPATIENT
Start: 2020-06-30 | End: 2020-09-30

## 2020-07-01 RX ORDER — TRIAMTERENE AND HYDROCHLOROTHIAZIDE 37.5; 25 MG/1; MG/1
CAPSULE ORAL
Qty: 90 CAP | Refills: 3 | Status: SHIPPED | OUTPATIENT
Start: 2020-07-01 | End: 2021-07-16

## 2020-07-01 RX ORDER — FENOFIBRATE 145 MG/1
TABLET, COATED ORAL
Qty: 90 TAB | Refills: 3 | Status: SHIPPED | OUTPATIENT
Start: 2020-07-01 | End: 2021-07-26

## 2020-07-07 ENCOUNTER — OFFICE VISIT (OUTPATIENT)
Dept: MEDICAL GROUP | Age: 55
End: 2020-07-07
Payer: COMMERCIAL

## 2020-07-07 VITALS
HEART RATE: 52 BPM | SYSTOLIC BLOOD PRESSURE: 118 MMHG | DIASTOLIC BLOOD PRESSURE: 86 MMHG | BODY MASS INDEX: 27.2 KG/M2 | HEIGHT: 70 IN | OXYGEN SATURATION: 99 % | WEIGHT: 190 LBS | TEMPERATURE: 97.5 F

## 2020-07-07 DIAGNOSIS — I10 ESSENTIAL HYPERTENSION: ICD-10-CM

## 2020-07-07 DIAGNOSIS — Z23 NEED FOR VACCINATION: Primary | ICD-10-CM

## 2020-07-07 DIAGNOSIS — S89.90XA INJURY OF CALF: ICD-10-CM

## 2020-07-07 DIAGNOSIS — E78.00 PURE HYPERCHOLESTEROLEMIA: ICD-10-CM

## 2020-07-07 PROCEDURE — 99214 OFFICE O/P EST MOD 30 MIN: CPT | Performed by: FAMILY MEDICINE

## 2020-07-07 ASSESSMENT — FIBROSIS 4 INDEX: FIB4 SCORE: 1.42
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
## 2020-07-08 ENCOUNTER — HOSPITAL ENCOUNTER (OUTPATIENT)
Dept: RADIOLOGY | Facility: MEDICAL CENTER | Age: 55
End: 2020-07-08
Attending: FAMILY MEDICINE
Payer: COMMERCIAL

## 2020-07-08 DIAGNOSIS — S89.90XA INJURY OF CALF: ICD-10-CM

## 2020-07-08 PROCEDURE — 93971 EXTREMITY STUDY: CPT | Mod: LT

## 2020-07-10 ENCOUNTER — APPOINTMENT (OUTPATIENT)
Dept: MEDICAL GROUP | Age: 55
End: 2020-07-10
Payer: COMMERCIAL

## 2020-07-10 DIAGNOSIS — S89.90XA INJURY OF CALF: ICD-10-CM

## 2020-07-13 ENCOUNTER — OFFICE VISIT (OUTPATIENT)
Dept: MEDICAL GROUP | Age: 55
End: 2020-07-13
Payer: COMMERCIAL

## 2020-07-13 DIAGNOSIS — S81.812A LACERATION OF CALF, LEFT, INITIAL ENCOUNTER: ICD-10-CM

## 2020-07-13 DIAGNOSIS — L03.116 CELLULITIS OF LEFT LOWER LEG: ICD-10-CM

## 2020-07-13 PROCEDURE — 99203 OFFICE O/P NEW LOW 30 MIN: CPT | Performed by: INTERNAL MEDICINE

## 2020-07-13 RX ORDER — CEPHALEXIN 500 MG/1
500 CAPSULE ORAL 4 TIMES DAILY
Qty: 40 CAP | Refills: 0 | Status: SHIPPED | OUTPATIENT
Start: 2020-07-13 | End: 2020-09-08

## 2020-07-13 NOTE — PROGRESS NOTES
This a new patient to me unable to see PCP today.  He came in for suture removal of a large deep 7 x 12 cm triangular-shaped laceration with central eschar, and 30 2 sutures being placed on 6/27/2020.  He was treated with cephalexin for 10 days and had an ultrasound of his leg which was negative for DVT.  The wound has slowly improved but is still wide open with the central flap that sutured as well and serosanguineous drainage from this flap as well as the peripheral margins.  Around the 8 open wound is 3 to 4 cm of peripheral induration and erythema and warmth suggestive of some persistent cellulitis.    The wounds was cleansed with peroxide and redressed with Neosporin ointment and patient advised to follow-up with wound clinic as this will take several weeks to heal then, perhaps 3 months.  I left the sutures intact until he could be reevaluated by his PCP later this week if he is unable to get into the wound clinic in a timely manner.    In addition to the wound clinic referral and redressing of the wound, I placed him on another 10-day course of cephalexin.

## 2020-07-17 ENCOUNTER — OFFICE VISIT (OUTPATIENT)
Dept: MEDICAL GROUP | Age: 55
End: 2020-07-17
Payer: COMMERCIAL

## 2020-07-17 VITALS
WEIGHT: 191 LBS | OXYGEN SATURATION: 99 % | TEMPERATURE: 97.7 F | SYSTOLIC BLOOD PRESSURE: 128 MMHG | BODY MASS INDEX: 27.35 KG/M2 | HEIGHT: 70 IN | HEART RATE: 55 BPM | DIASTOLIC BLOOD PRESSURE: 68 MMHG

## 2020-07-17 DIAGNOSIS — T14.8XXA OPEN WOUND: ICD-10-CM

## 2020-07-17 PROCEDURE — 99214 OFFICE O/P EST MOD 30 MIN: CPT | Performed by: FAMILY MEDICINE

## 2020-07-17 RX ORDER — AMOXICILLIN AND CLAVULANATE POTASSIUM 875; 125 MG/1; MG/1
1 TABLET, FILM COATED ORAL 2 TIMES DAILY
Qty: 20 TAB | Refills: 0 | Status: SHIPPED | OUTPATIENT
Start: 2020-07-17 | End: 2020-09-08

## 2020-07-17 ASSESSMENT — FIBROSIS 4 INDEX: FIB4 SCORE: 1.42
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
## 2020-07-20 ENCOUNTER — NON-PROVIDER VISIT (OUTPATIENT)
Dept: WOUND CARE | Facility: MEDICAL CENTER | Age: 55
End: 2020-07-20
Attending: FAMILY MEDICINE
Payer: COMMERCIAL

## 2020-07-20 PROCEDURE — 99211 OFF/OP EST MAY X REQ PHY/QHP: CPT

## 2020-07-20 PROCEDURE — 97597 DBRDMT OPN WND 1ST 20 CM/<: CPT

## 2020-07-20 PROCEDURE — 97598 DBRDMT OPN WND ADDL 20CM/<: CPT

## 2020-07-20 SDOH — HEALTH STABILITY: MENTAL HEALTH: HOW OFTEN DO YOU HAVE A DRINK CONTAINING ALCOHOL?: 2-3 TIMES A WEEK

## 2020-07-20 SDOH — HEALTH STABILITY: MENTAL HEALTH: HOW OFTEN DO YOU HAVE 6 OR MORE DRINKS ON ONE OCCASION?: MONTHLY

## 2020-07-20 SDOH — HEALTH STABILITY: MENTAL HEALTH: HOW MANY STANDARD DRINKS CONTAINING ALCOHOL DO YOU HAVE ON A TYPICAL DAY?: 1 OR 2
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
## 2020-07-21 NOTE — PATIENT INSTRUCTIONS
-Keep dressings clean, dry and covered while bathing. Change dressings if they become over saturated, soiled or fall off; or once in between clinic visits.     -Avoid prolonged standing or sitting without elevating your legs.    -Remove your compression garments if you have severe pain, severe swelling, numbness, color change, or temperature change in your toes. If you need to remove your compression garments, do so by unrolling them. Do not cut the compression garments off, this is to prevent cutting yourself on accident.    -Never walk around the house barefoot. Always wear a rubber soled slipper when walking around the house.    -Should you experience any significant changes in your wound(s), such as infection (redness, swelling, localized heat, increased pain, fever > 101 F, chills) or have any questions regarding your home care instructions, please contact the wound center at [phone]. If after hours, contact your primary care physician or go to the hospital emergency room.

## 2020-07-21 NOTE — NON-PROVIDER
ABIs performed with Left LE of .98 and Right LE of .95, Left lower extremity pulses palpable 2+ DP and PT and doppler triphasic on both DP and PT.

## 2020-07-24 ENCOUNTER — OFFICE VISIT (OUTPATIENT)
Dept: WOUND CARE | Facility: MEDICAL CENTER | Age: 55
End: 2020-07-24
Attending: FAMILY MEDICINE
Payer: COMMERCIAL

## 2020-07-24 VITALS
OXYGEN SATURATION: 96 % | HEART RATE: 58 BPM | TEMPERATURE: 98 F | RESPIRATION RATE: 16 BRPM | SYSTOLIC BLOOD PRESSURE: 140 MMHG | DIASTOLIC BLOOD PRESSURE: 92 MMHG

## 2020-07-24 DIAGNOSIS — L08.9 INFECTED OPEN WOUND: ICD-10-CM

## 2020-07-24 DIAGNOSIS — T14.8XXA INFECTED OPEN WOUND: ICD-10-CM

## 2020-07-24 DIAGNOSIS — S81.802A OPEN LEG WOUND, LEFT, INITIAL ENCOUNTER: ICD-10-CM

## 2020-07-24 DIAGNOSIS — T14.8XXA NONHEALING NONSURGICAL WOUND WITH FAT LAYER EXPOSED: ICD-10-CM

## 2020-07-24 PROCEDURE — 11045 DBRDMT SUBQ TISS EACH ADDL: CPT

## 2020-07-24 PROCEDURE — 99213 OFFICE O/P EST LOW 20 MIN: CPT

## 2020-07-24 PROCEDURE — 11042 DBRDMT SUBQ TIS 1ST 20SQCM/<: CPT

## 2020-07-24 ASSESSMENT — PAIN SCALES - GENERAL: PAINLEVEL: NO PAIN

## 2020-07-24 NOTE — PATIENT INSTRUCTIONS
-Keep your wound dressing clean, dry, and intact.    -Change your dressing every 2 to 3 days or if it becomes soiled, soaked, or falls off.    -Should you experience any significant changes in your wound(s), such as infection (redness, swelling, localized heat, increased pain, fever > 101 F, chills) or have any questions regarding your home care instructions, please contact the wound center at [phone]. If after hours, contact your primary care physician or go to the hospital emergency room.
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
## 2020-07-31 ENCOUNTER — OFFICE VISIT (OUTPATIENT)
Dept: WOUND CARE | Facility: MEDICAL CENTER | Age: 55
End: 2020-07-31
Attending: FAMILY MEDICINE
Payer: COMMERCIAL

## 2020-07-31 VITALS
HEART RATE: 54 BPM | OXYGEN SATURATION: 98 % | SYSTOLIC BLOOD PRESSURE: 136 MMHG | DIASTOLIC BLOOD PRESSURE: 84 MMHG | RESPIRATION RATE: 20 BRPM | TEMPERATURE: 97.6 F

## 2020-07-31 DIAGNOSIS — I10 ESSENTIAL HYPERTENSION: ICD-10-CM

## 2020-07-31 DIAGNOSIS — S89.90XA INJURY OF CALF: ICD-10-CM

## 2020-07-31 DIAGNOSIS — S81.802D OPEN LEG WOUND, LEFT, SUBSEQUENT ENCOUNTER: ICD-10-CM

## 2020-07-31 DIAGNOSIS — T14.8XXA NONHEALING NONSURGICAL WOUND WITH FAT LAYER EXPOSED: ICD-10-CM

## 2020-07-31 PROCEDURE — 11042 DBRDMT SUBQ TIS 1ST 20SQCM/<: CPT

## 2020-07-31 PROCEDURE — 11045 DBRDMT SUBQ TISS EACH ADDL: CPT

## 2020-07-31 NOTE — PATIENT INSTRUCTIONS
-Keep your wound dressing clean, dry, and intact.    -Change your dressing if it becomes soiled, soaked, or falls off.    -Should you experience any significant changes in your wound(s), such as infection (redness, swelling, localized heat, increased pain, fever > 101 F, chills) or have any questions regarding your home care instructions, please contact the wound center at [phone]. If after hours, contact your primary care physician or go to the hospital emergency room.
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
## 2020-08-07 ENCOUNTER — OFFICE VISIT (OUTPATIENT)
Dept: WOUND CARE | Facility: MEDICAL CENTER | Age: 55
End: 2020-08-07
Attending: FAMILY MEDICINE
Payer: COMMERCIAL

## 2020-08-07 VITALS
SYSTOLIC BLOOD PRESSURE: 137 MMHG | TEMPERATURE: 98.9 F | DIASTOLIC BLOOD PRESSURE: 95 MMHG | HEART RATE: 53 BPM | RESPIRATION RATE: 16 BRPM | OXYGEN SATURATION: 98 %

## 2020-08-07 DIAGNOSIS — I10 ESSENTIAL HYPERTENSION: ICD-10-CM

## 2020-08-07 DIAGNOSIS — T14.8XXA NONHEALING NONSURGICAL WOUND WITH FAT LAYER EXPOSED: ICD-10-CM

## 2020-08-07 DIAGNOSIS — S89.90XA INJURY OF CALF: ICD-10-CM

## 2020-08-07 DIAGNOSIS — S81.802D OPEN LEG WOUND, LEFT, SUBSEQUENT ENCOUNTER: ICD-10-CM

## 2020-08-07 DIAGNOSIS — T14.8XXA INFECTED OPEN WOUND: ICD-10-CM

## 2020-08-07 DIAGNOSIS — L08.9 INFECTED OPEN WOUND: ICD-10-CM

## 2020-08-07 PROCEDURE — 11042 DBRDMT SUBQ TIS 1ST 20SQCM/<: CPT

## 2020-08-07 PROCEDURE — 11045 DBRDMT SUBQ TISS EACH ADDL: CPT
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
## 2020-08-07 NOTE — PATIENT INSTRUCTIONS
-Keep dressings clean, dry and covered while bathing. Only change dressings if they become over saturated, soiled or fall off.     -Avoid prolonged standing or sitting without elevating your legs.    -Remove your compression garments if you have severe pain, severe swelling, numbness, color change, or temperature change in your toes. If you need to remove your compression garments, do so by unrolling them. Do not cut the compression garments off, this is to prevent cutting yourself on accident.    -Should you experience any significant changes in your wound(s), such as infection (redness, swelling, localized heat, increased pain, fever > 101 F, chills) or have any questions regarding your home care instructions, please contact the wound center at [phone]. If after hours, contact your primary care physician or go to the hospital emergency room.

## 2020-08-14 ENCOUNTER — NON-PROVIDER VISIT (OUTPATIENT)
Dept: WOUND CARE | Facility: MEDICAL CENTER | Age: 55
End: 2020-08-14
Attending: FAMILY MEDICINE
Payer: COMMERCIAL

## 2020-08-14 PROCEDURE — 97598 DBRDMT OPN WND ADDL 20CM/<: CPT

## 2020-08-14 PROCEDURE — 97597 DBRDMT OPN WND 1ST 20 CM/<: CPT

## 2020-08-14 NOTE — PROCEDURES
CSWD using curette to remove ~29cm2 nonviable tissue from LLE posterior wound bed. 2% topical Lidocaine applied prior to debridement with ~5 minute dwell time. Patient tolerated well; denied pain.

## 2020-08-14 NOTE — PATIENT INSTRUCTIONS
-Keep your wound dressing clean, dry, and intact.    -Change your dressing every 2 to 3 days or if it becomes soiled, soaked, or falls off.    -Should you experience any significant changes in your wound(s), such as infection (redness, swelling, localized heat, increased pain, fever > 101 F, chills) or have any questions regarding your home care instructions, please contact the wound center at [phone]. If after hours, contact your primary care physician or go to the hospital emergency room.

## 2020-08-21 ENCOUNTER — OFFICE VISIT (OUTPATIENT)
Dept: WOUND CARE | Facility: MEDICAL CENTER | Age: 55
End: 2020-08-21
Attending: FAMILY MEDICINE
Payer: COMMERCIAL

## 2020-08-21 VITALS
RESPIRATION RATE: 16 BRPM | SYSTOLIC BLOOD PRESSURE: 142 MMHG | HEART RATE: 55 BPM | TEMPERATURE: 98.1 F | DIASTOLIC BLOOD PRESSURE: 97 MMHG | OXYGEN SATURATION: 97 %

## 2020-08-21 DIAGNOSIS — T14.8XXA INFECTED OPEN WOUND: ICD-10-CM

## 2020-08-21 DIAGNOSIS — S81.802D OPEN LEG WOUND, LEFT, SUBSEQUENT ENCOUNTER: ICD-10-CM

## 2020-08-21 DIAGNOSIS — T14.8XXA NONHEALING NONSURGICAL WOUND WITH FAT LAYER EXPOSED: ICD-10-CM

## 2020-08-21 DIAGNOSIS — I10 ESSENTIAL HYPERTENSION: ICD-10-CM

## 2020-08-21 DIAGNOSIS — L08.9 INFECTED OPEN WOUND: ICD-10-CM

## 2020-08-21 DIAGNOSIS — S89.90XA INJURY OF CALF: ICD-10-CM

## 2020-08-21 PROCEDURE — 11042 DBRDMT SUBQ TIS 1ST 20SQCM/<: CPT

## 2020-08-21 PROCEDURE — 11045 DBRDMT SUBQ TISS EACH ADDL: CPT
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
## 2020-08-21 NOTE — PATIENT INSTRUCTIONS
-Keep your wound dressing clean, dry, and intact.    -Change your dressing if it becomes soiled, soaked, or falls off.    -Should you experience any significant changes in your wound(s), such as infection (redness, swelling, localized heat, increased pain, fever > 101 F, chills) or have any questions regarding your home care instructions, please contact the wound center at [phone]. If after hours, contact your primary care physician or go to the hospital emergency room.

## 2020-08-28 ENCOUNTER — OFFICE VISIT (OUTPATIENT)
Dept: WOUND CARE | Facility: MEDICAL CENTER | Age: 55
End: 2020-08-28
Attending: FAMILY MEDICINE
Payer: COMMERCIAL

## 2020-08-28 VITALS
RESPIRATION RATE: 20 BRPM | SYSTOLIC BLOOD PRESSURE: 146 MMHG | DIASTOLIC BLOOD PRESSURE: 95 MMHG | OXYGEN SATURATION: 97 % | HEART RATE: 61 BPM | TEMPERATURE: 97.8 F

## 2020-08-28 DIAGNOSIS — I10 ESSENTIAL HYPERTENSION: ICD-10-CM

## 2020-08-28 DIAGNOSIS — S81.802D OPEN LEG WOUND, LEFT, SUBSEQUENT ENCOUNTER: ICD-10-CM

## 2020-08-28 DIAGNOSIS — T14.8XXA NONHEALING NONSURGICAL WOUND WITH FAT LAYER EXPOSED: ICD-10-CM

## 2020-08-28 DIAGNOSIS — S89.90XA INJURY OF CALF: ICD-10-CM

## 2020-08-28 PROCEDURE — 11042 DBRDMT SUBQ TIS 1ST 20SQCM/<: CPT

## 2020-08-28 ASSESSMENT — PAIN SCALES - GENERAL: PAINLEVEL: NO PAIN

## 2020-08-28 NOTE — PATIENT INSTRUCTIONS
Should you experience any significant changes in your wound(s) such as infection (redness, swelling, localized heat, increased pain, fever >101 F, chills) or have any questions regarding your home care instructions, please contact the wound center [phone]. If after hours, contact your primary care physician or go the hospital emergency room.  Keep dressing clean and dry and cover while bathing. Only change dressing if over saturated, soiled or its falling off.
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
## 2020-09-04 ENCOUNTER — HOSPITAL ENCOUNTER (OUTPATIENT)
Facility: MEDICAL CENTER | Age: 55
End: 2020-09-04
Attending: NURSE PRACTITIONER
Payer: COMMERCIAL

## 2020-09-04 ENCOUNTER — OFFICE VISIT (OUTPATIENT)
Dept: WOUND CARE | Facility: MEDICAL CENTER | Age: 55
End: 2020-09-04
Attending: FAMILY MEDICINE
Payer: COMMERCIAL

## 2020-09-04 VITALS
OXYGEN SATURATION: 99 % | TEMPERATURE: 98.1 F | HEART RATE: 52 BPM | SYSTOLIC BLOOD PRESSURE: 140 MMHG | DIASTOLIC BLOOD PRESSURE: 86 MMHG | RESPIRATION RATE: 16 BRPM

## 2020-09-04 DIAGNOSIS — T14.8XXA NONHEALING NONSURGICAL WOUND WITH FAT LAYER EXPOSED: ICD-10-CM

## 2020-09-04 DIAGNOSIS — L08.9 INFECTED OPEN WOUND: ICD-10-CM

## 2020-09-04 DIAGNOSIS — T14.8XXA INFECTED WOUND: ICD-10-CM

## 2020-09-04 DIAGNOSIS — I10 ESSENTIAL HYPERTENSION: ICD-10-CM

## 2020-09-04 DIAGNOSIS — S89.90XA INJURY OF CALF: ICD-10-CM

## 2020-09-04 DIAGNOSIS — S81.802D OPEN LEG WOUND, LEFT, SUBSEQUENT ENCOUNTER: ICD-10-CM

## 2020-09-04 DIAGNOSIS — T14.8XXA INFECTED OPEN WOUND: ICD-10-CM

## 2020-09-04 DIAGNOSIS — L08.9 INFECTED WOUND: ICD-10-CM

## 2020-09-04 LAB
GRAM STN SPEC: NORMAL
SIGNIFICANT IND 70042: NORMAL
SITE SITE: NORMAL
SOURCE SOURCE: NORMAL

## 2020-09-04 PROCEDURE — 87205 SMEAR GRAM STAIN: CPT

## 2020-09-04 PROCEDURE — 87070 CULTURE OTHR SPECIMN AEROBIC: CPT

## 2020-09-04 PROCEDURE — 11042 DBRDMT SUBQ TIS 1ST 20SQCM/<: CPT

## 2020-09-04 PROCEDURE — 87077 CULTURE AEROBIC IDENTIFY: CPT

## 2020-09-04 PROCEDURE — 87186 SC STD MICRODIL/AGAR DIL: CPT | Mod: 91

## 2020-09-04 PROCEDURE — 17250 CHEM CAUT OF GRANLTJ TISSUE: CPT
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
## 2020-09-04 NOTE — PATIENT INSTRUCTIONS
-Keep your wound dressing clean, dry, and intact.    -Change your dressing if it becomes soiled, soaked, or falls off.    -Should you experience any significant changes in your wound(s), such as infection (redness, swelling, localized heat, increased pain, fever > 101 F, chills) or have any questions regarding your home care instructions, please contact the wound center at [phone]. If after hours, contact your primary care physician or go to the hospital emergency room.

## 2020-09-06 LAB
BACTERIA WND AEROBE CULT: ABNORMAL
GRAM STN SPEC: ABNORMAL
SIGNIFICANT IND 70042: ABNORMAL
SITE SITE: ABNORMAL
SOURCE SOURCE: ABNORMAL

## 2020-09-08 DIAGNOSIS — L08.9 INFECTED OPEN WOUND: ICD-10-CM

## 2020-09-08 DIAGNOSIS — T14.8XXA INFECTED OPEN WOUND: ICD-10-CM

## 2020-09-08 RX ORDER — CIPROFLOXACIN 500 MG/1
500 TABLET, FILM COATED ORAL 2 TIMES DAILY
Qty: 20 TAB | Refills: 0 | Status: SHIPPED | OUTPATIENT
Start: 2020-09-08 | End: 2020-09-18

## 2020-09-08 RX ORDER — AMOXICILLIN 250 MG/1
500 CAPSULE ORAL 3 TIMES DAILY
Qty: 60 CAP | Refills: 0 | Status: SHIPPED | OUTPATIENT
Start: 2020-09-08 | End: 2020-09-18

## 2020-09-08 NOTE — PROGRESS NOTES
Wound culture results reviewed.  Clinic notes and photos reviewed.  TC to patient, informed about culture results, and verified pharmacy information.  Rx for Cipro and amoxicillin sent to patient's pharmacy.  Patient advised to discontinue if he experiences any adverse reactions, and to notify us immediately.

## 2020-09-11 ENCOUNTER — OFFICE VISIT (OUTPATIENT)
Dept: WOUND CARE | Facility: MEDICAL CENTER | Age: 55
End: 2020-09-11
Attending: FAMILY MEDICINE
Payer: COMMERCIAL

## 2020-09-11 VITALS
OXYGEN SATURATION: 97 % | HEART RATE: 50 BPM | RESPIRATION RATE: 16 BRPM | TEMPERATURE: 97.8 F | DIASTOLIC BLOOD PRESSURE: 95 MMHG | SYSTOLIC BLOOD PRESSURE: 133 MMHG

## 2020-09-11 DIAGNOSIS — S81.802D OPEN LEG WOUND, LEFT, SUBSEQUENT ENCOUNTER: ICD-10-CM

## 2020-09-11 DIAGNOSIS — T14.8XXA NONHEALING NONSURGICAL WOUND WITH FAT LAYER EXPOSED: ICD-10-CM

## 2020-09-11 DIAGNOSIS — S89.90XA INJURY OF CALF: ICD-10-CM

## 2020-09-11 DIAGNOSIS — I10 ESSENTIAL HYPERTENSION: ICD-10-CM

## 2020-09-11 DIAGNOSIS — T14.8XXA OPEN WOUND: ICD-10-CM

## 2020-09-11 PROCEDURE — 11042 DBRDMT SUBQ TIS 1ST 20SQCM/<: CPT

## 2020-09-11 PROCEDURE — 17250 CHEM CAUT OF GRANLTJ TISSUE: CPT

## 2020-09-11 ASSESSMENT — PAIN SCALES - GENERAL: PAINLEVEL: NO PAIN
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
## 2020-09-11 NOTE — PATIENT INSTRUCTIONS
-Keep your wound dressing clean, dry, and intact.    -Change your dressing every 3 to 4 days or if it becomes soiled, soaked, or falls off.    -Should you experience any significant changes in your wound(s), such as infection (redness, swelling, localized heat, increased pain, fever > 101 F, chills) or have any questions regarding your home care instructions, please contact the wound center at [phone]. If after hours, contact your primary care physician or go to the hospital emergency room.

## 2020-09-18 ENCOUNTER — OFFICE VISIT (OUTPATIENT)
Dept: WOUND CARE | Facility: MEDICAL CENTER | Age: 55
End: 2020-09-18
Attending: FAMILY MEDICINE
Payer: COMMERCIAL

## 2020-09-18 VITALS
SYSTOLIC BLOOD PRESSURE: 144 MMHG | DIASTOLIC BLOOD PRESSURE: 96 MMHG | RESPIRATION RATE: 20 BRPM | TEMPERATURE: 98.9 F | OXYGEN SATURATION: 97 % | HEART RATE: 78 BPM

## 2020-09-18 DIAGNOSIS — I10 ESSENTIAL HYPERTENSION: ICD-10-CM

## 2020-09-18 DIAGNOSIS — S81.802D OPEN LEG WOUND, LEFT, SUBSEQUENT ENCOUNTER: ICD-10-CM

## 2020-09-18 DIAGNOSIS — L08.9 INFECTED OPEN WOUND: ICD-10-CM

## 2020-09-18 DIAGNOSIS — T14.8XXA INFECTED OPEN WOUND: ICD-10-CM

## 2020-09-18 DIAGNOSIS — S89.90XA INJURY OF CALF: ICD-10-CM

## 2020-09-18 DIAGNOSIS — T14.8XXA NONHEALING NONSURGICAL WOUND WITH FAT LAYER EXPOSED: ICD-10-CM

## 2020-09-18 PROCEDURE — 11042 DBRDMT SUBQ TIS 1ST 20SQCM/<: CPT

## 2020-09-18 NOTE — PATIENT INSTRUCTIONS
Should you experience any significant changes in your wound(s) such as infection (redness, swelling, localized heat, increased pain, fever >101 F, chills) or have any questions regarding your home care instructions, please contact the wound center [phone]. If after hours, contact your primary care physician or go the hospital emergency room.  Keep dressing clean and dry and cover while bathing. Only change dressing if over saturated, soiled or its falling off.
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
## 2020-09-25 ENCOUNTER — OFFICE VISIT (OUTPATIENT)
Dept: WOUND CARE | Facility: MEDICAL CENTER | Age: 55
End: 2020-09-25
Attending: FAMILY MEDICINE
Payer: COMMERCIAL

## 2020-09-25 VITALS
RESPIRATION RATE: 20 BRPM | TEMPERATURE: 98.4 F | HEART RATE: 60 BPM | OXYGEN SATURATION: 98 % | SYSTOLIC BLOOD PRESSURE: 148 MMHG | DIASTOLIC BLOOD PRESSURE: 101 MMHG

## 2020-09-25 DIAGNOSIS — S89.90XA INJURY OF CALF: ICD-10-CM

## 2020-09-25 DIAGNOSIS — T14.8XXA NONHEALING NONSURGICAL WOUND WITH FAT LAYER EXPOSED: ICD-10-CM

## 2020-09-25 DIAGNOSIS — T14.8XXA INFECTED OPEN WOUND: ICD-10-CM

## 2020-09-25 DIAGNOSIS — L08.9 INFECTED OPEN WOUND: ICD-10-CM

## 2020-09-25 DIAGNOSIS — I10 ESSENTIAL HYPERTENSION: ICD-10-CM

## 2020-09-25 DIAGNOSIS — S81.802D OPEN LEG WOUND, LEFT, SUBSEQUENT ENCOUNTER: ICD-10-CM

## 2020-09-25 PROCEDURE — 11042 DBRDMT SUBQ TIS 1ST 20SQCM/<: CPT

## 2020-09-25 ASSESSMENT — PAIN SCALES - GENERAL: PAINLEVEL: NO PAIN

## 2020-09-25 NOTE — PATIENT INSTRUCTIONS
Avoid prolonged standing or sitting without elevating your legs.  - Apply tubigrip to your legs ending 2 fingers below back of knee without wrinkles.   If compression needs to be removed, un-wrap it do not cut it off.     Should you experience any significant changes in your wound(s), such as infection (redness, swelling, localized heat, increased pain, fever > 101 F, chills) or have any questions regarding your home care instructions, please contact the wound center at [phone]. If after hours, contact your primary care physician or go to the hospital emergency room.   Keep dressing clean, dry and covered while bathing. Only change dressing if it becomes over saturated, soiled or falls off.
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
## 2020-09-30 RX ORDER — ATORVASTATIN CALCIUM 40 MG/1
TABLET, FILM COATED ORAL
Qty: 90 TAB | Refills: 0 | Status: SHIPPED | OUTPATIENT
Start: 2020-09-30 | End: 2021-01-05

## 2020-09-30 RX ORDER — ATENOLOL 50 MG/1
TABLET ORAL
Qty: 90 TAB | Refills: 0 | Status: SHIPPED | OUTPATIENT
Start: 2020-09-30 | End: 2021-01-05

## 2020-10-02 ENCOUNTER — OFFICE VISIT (OUTPATIENT)
Dept: WOUND CARE | Facility: MEDICAL CENTER | Age: 55
End: 2020-10-02
Attending: FAMILY MEDICINE
Payer: COMMERCIAL

## 2020-10-02 VITALS
OXYGEN SATURATION: 97 % | SYSTOLIC BLOOD PRESSURE: 140 MMHG | HEART RATE: 57 BPM | RESPIRATION RATE: 18 BRPM | TEMPERATURE: 97.9 F | DIASTOLIC BLOOD PRESSURE: 103 MMHG

## 2020-10-02 DIAGNOSIS — T14.8XXA PAIN ASSOCIATED WITH WOUND: ICD-10-CM

## 2020-10-02 DIAGNOSIS — T14.8XXA INFECTED OPEN WOUND: ICD-10-CM

## 2020-10-02 DIAGNOSIS — L08.9 INFECTED OPEN WOUND: ICD-10-CM

## 2020-10-02 DIAGNOSIS — S81.802D OPEN LEG WOUND, LEFT, SUBSEQUENT ENCOUNTER: Primary | ICD-10-CM

## 2020-10-02 DIAGNOSIS — R52 PAIN ASSOCIATED WITH WOUND: ICD-10-CM

## 2020-10-02 PROCEDURE — 11042 DBRDMT SUBQ TIS 1ST 20SQCM/<: CPT

## 2020-10-02 PROCEDURE — 11042 DBRDMT SUBQ TIS 1ST 20SQCM/<: CPT | Performed by: NURSE PRACTITIONER

## 2020-10-02 ASSESSMENT — ENCOUNTER SYMPTOMS
VOMITING: 0
COUGH: 0
SHORTNESS OF BREATH: 0
CHILLS: 0
DIARRHEA: 0
FEVER: 0
NAUSEA: 0
CLAUDICATION: 0
CONSTIPATION: 0
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
## 2020-10-02 NOTE — PATIENT INSTRUCTIONS
-Keep your wound dressing clean, dry, and intact.    -Change your dressing if it becomes soiled, soaked, or falls off.    -Should you experience any significant changes in your wound(s), such as infection (redness, swelling, localized heat, increased pain, fever > 101 F, chills) or have any questions regarding your home care instructions, please contact the wound center at [phone]. If after hours, contact your primary care physician or go to the hospital emergency room.

## 2020-10-09 ENCOUNTER — NON-PROVIDER VISIT (OUTPATIENT)
Dept: WOUND CARE | Facility: MEDICAL CENTER | Age: 55
End: 2020-10-09
Attending: FAMILY MEDICINE
Payer: COMMERCIAL

## 2020-10-09 PROCEDURE — 97597 DBRDMT OPN WND 1ST 20 CM/<: CPT

## 2020-10-09 NOTE — PROCEDURES
2% topical Lidocaine applied prior to debridement with ~5 minute dwell time. CSWD using curette to remove ~1.8 cm2 nonviable tissue from LLE posterior wound bed.  Patient tolerated well; denied pain.

## 2020-10-09 NOTE — PATIENT INSTRUCTIONS
-Keep your wound dressing clean, dry, and intact.    -Change your dressing if it becomes soiled, soaked, or falls off.    -Should you experience any significant changes in your wound(s), such as infection (redness, swelling, localized heat, increased pain, fever > 101 F, chills) or have any questions regarding your home care instructions, please contact the wound center at [phone]. If after hours, contact your primary care physician or go to the hospital emergency room.

## 2020-10-16 ENCOUNTER — OFFICE VISIT (OUTPATIENT)
Dept: WOUND CARE | Facility: MEDICAL CENTER | Age: 55
End: 2020-10-16
Attending: FAMILY MEDICINE
Payer: COMMERCIAL

## 2020-10-16 VITALS
HEART RATE: 67 BPM | TEMPERATURE: 97.8 F | RESPIRATION RATE: 20 BRPM | OXYGEN SATURATION: 98 % | DIASTOLIC BLOOD PRESSURE: 79 MMHG | SYSTOLIC BLOOD PRESSURE: 139 MMHG

## 2020-10-16 DIAGNOSIS — L08.9 INFECTED OPEN WOUND: ICD-10-CM

## 2020-10-16 DIAGNOSIS — T14.8XXA INFECTED OPEN WOUND: ICD-10-CM

## 2020-10-16 DIAGNOSIS — S81.802D OPEN LEG WOUND, LEFT, SUBSEQUENT ENCOUNTER: ICD-10-CM

## 2020-10-16 DIAGNOSIS — L30.9 DERMATITIS: ICD-10-CM

## 2020-10-16 DIAGNOSIS — R52 PAIN ASSOCIATED WITH WOUND: ICD-10-CM

## 2020-10-16 DIAGNOSIS — T14.8XXA PAIN ASSOCIATED WITH WOUND: ICD-10-CM

## 2020-10-16 PROCEDURE — 11042 DBRDMT SUBQ TIS 1ST 20SQCM/<: CPT

## 2020-10-16 PROCEDURE — 11042 DBRDMT SUBQ TIS 1ST 20SQCM/<: CPT | Performed by: NURSE PRACTITIONER

## 2020-10-16 RX ORDER — TRIAMCINOLONE ACETONIDE 1 MG/G
1 OINTMENT TOPICAL 2 TIMES DAILY
Qty: 30 G | Refills: 0 | Status: SHIPPED | OUTPATIENT
Start: 2020-10-16 | End: 2022-12-20

## 2020-10-16 ASSESSMENT — ENCOUNTER SYMPTOMS
VOMITING: 0
CHILLS: 0
CLAUDICATION: 0
NAUSEA: 0
COUGH: 0
FEVER: 0
DIARRHEA: 0
SHORTNESS OF BREATH: 0
CONSTIPATION: 0

## 2020-10-16 NOTE — PATIENT INSTRUCTIONS
Keep dressing clean and dry and cover while bathing. Only change dressing if over saturated, soiled or its falling off.     Should you experience any significant changes in your wound(s) such as infection (redness, swelling, localized heat, increased pain, fever >101 F, chills) or have any questions regarding your home care instructions, please contact the wound center [phone]. If after hours, contact your primary care physician or go the hospital emergency room.
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
## 2020-10-23 ENCOUNTER — NON-PROVIDER VISIT (OUTPATIENT)
Dept: WOUND CARE | Facility: MEDICAL CENTER | Age: 55
End: 2020-10-23
Attending: FAMILY MEDICINE
Payer: COMMERCIAL

## 2020-10-23 PROCEDURE — 97597 DBRDMT OPN WND 1ST 20 CM/<: CPT

## 2020-10-23 NOTE — PROCEDURES
2% Viscous lidocaine applied to wound and periwound 5 minutes dwell time.  CSWD using curette to remove approx. ~0.5cm2 of nonviable tissue from wound bed.

## 2020-10-23 NOTE — PATIENT INSTRUCTIONS
Keep dressing clean and dry and cover while bathing. Only change dressing if over saturated, soiled or its falling off.     Should you experience any significant changes in your wound(s) such as infection (redness, swelling, localized heat, increased pain, fever >101 F, chills) or have any questions regarding your home care instructions, please contact the wound center [phone]. If after hours, contact your primary care physician or go the hospital emergency room.

## 2020-10-30 ENCOUNTER — NON-PROVIDER VISIT (OUTPATIENT)
Dept: WOUND CARE | Facility: MEDICAL CENTER | Age: 55
End: 2020-10-30
Attending: FAMILY MEDICINE
Payer: COMMERCIAL

## 2020-10-30 PROCEDURE — 97597 DBRDMT OPN WND 1ST 20 CM/<: CPT

## 2020-10-30 NOTE — PROCEDURES
CSWD with scalpel, scissors & forceps to remove non-viable crust lip to medial aspect of LLE wound edge and non-viable slough to wound base of <~1.0 cm2.

## 2020-10-30 NOTE — PATIENT INSTRUCTIONS
Avoid prolonged standing or sitting without elevating your legs.  - Apply tubigrip to your legs ending 2 fingers below back of knee without wrinkles.     Should you experience any significant changes in your wound(s), such as infection (redness, swelling, localized heat, increased pain, fever > 101 F, chills) or have any questions regarding your home care instructions, please contact the wound center at [phone]. If after hours, contact your primary care physician or go to the hospital emergency room.   Keep dressing clean, dry and covered while bathing. Only change dressing if it becomes over saturated, soiled or falls off.   Apply Zinc cream to crust or red areas on skin & lotion to other intact skin.

## 2020-11-06 ENCOUNTER — NON-PROVIDER VISIT (OUTPATIENT)
Dept: WOUND CARE | Facility: MEDICAL CENTER | Age: 55
End: 2020-11-06
Attending: FAMILY MEDICINE
Payer: COMMERCIAL

## 2020-11-06 PROCEDURE — 97597 DBRDMT OPN WND 1ST 20 CM/<: CPT

## 2020-11-06 NOTE — PATIENT INSTRUCTIONS
Avoid prolonged standing or sitting without elevating your legs.  - Apply tubigrip to your legs ending 2 fingers below back of knee without wrinkles.  Only remove if temperature or sensation changes.   If compression needs to be removed, un-wrap it do not cut it off.     Should you experience any significant changes in your wound(s), such as infection (redness, swelling, localized heat, increased pain, fever > 101 F, chills) or have any questions regarding your home care instructions, please contact the wound center at [phone]. If after hours, contact your primary care physician or go to the hospital emergency room.   Keep dressing clean, dry and covered while bathing. Only change dressing if it becomes over saturated, soiled or falls off.

## 2020-11-06 NOTE — PROCEDURES
CSWD with scissors & forceps to remove crust lip at wound edges & scalpel to remove non-viable slough tissue at wound base of ~1cm2.

## 2020-11-13 ENCOUNTER — NON-PROVIDER VISIT (OUTPATIENT)
Dept: WOUND CARE | Facility: MEDICAL CENTER | Age: 55
End: 2020-11-13
Attending: FAMILY MEDICINE
Payer: COMMERCIAL

## 2020-11-13 PROCEDURE — 97597 DBRDMT OPN WND 1ST 20 CM/<: CPT

## 2020-11-13 NOTE — PROCEDURES
2% viscous lidocaine gel dwell time ~5 minutes prior to debridement.  CSWD with curette to remove ~1 cm2 of non viable tissue from wound bed.

## 2020-11-20 ENCOUNTER — OFFICE VISIT (OUTPATIENT)
Dept: WOUND CARE | Facility: MEDICAL CENTER | Age: 55
End: 2020-11-20
Attending: FAMILY MEDICINE
Payer: COMMERCIAL

## 2020-11-20 VITALS
RESPIRATION RATE: 16 BRPM | DIASTOLIC BLOOD PRESSURE: 94 MMHG | TEMPERATURE: 96.9 F | OXYGEN SATURATION: 100 % | SYSTOLIC BLOOD PRESSURE: 145 MMHG | HEART RATE: 58 BPM

## 2020-11-20 DIAGNOSIS — L08.9 INFECTED OPEN WOUND: ICD-10-CM

## 2020-11-20 DIAGNOSIS — T14.8XXA PAIN ASSOCIATED WITH WOUND: ICD-10-CM

## 2020-11-20 DIAGNOSIS — S81.802D OPEN LEG WOUND, LEFT, SUBSEQUENT ENCOUNTER: Primary | ICD-10-CM

## 2020-11-20 DIAGNOSIS — T14.8XXA INFECTED OPEN WOUND: ICD-10-CM

## 2020-11-20 DIAGNOSIS — R52 PAIN ASSOCIATED WITH WOUND: ICD-10-CM

## 2020-11-20 DIAGNOSIS — L30.9 DERMATITIS: ICD-10-CM

## 2020-11-20 PROCEDURE — 11042 DBRDMT SUBQ TIS 1ST 20SQCM/<: CPT

## 2020-11-20 PROCEDURE — 11042 DBRDMT SUBQ TIS 1ST 20SQCM/<: CPT | Performed by: NURSE PRACTITIONER

## 2020-11-20 ASSESSMENT — ENCOUNTER SYMPTOMS
SHORTNESS OF BREATH: 0
DIARRHEA: 0
VOMITING: 0
COUGH: 0
CONSTIPATION: 0
NAUSEA: 0
CHILLS: 0
FEVER: 0
CLAUDICATION: 0
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
## 2020-11-20 NOTE — PATIENT INSTRUCTIONS
-Keep dressings clean, dry and covered while bathing. Change dressings if they become over saturated, soiled or fall off; or every 3 days..     -Avoid prolonged standing or sitting without elevating your legs.    -Remove your compression garments if you have severe pain, severe swelling, numbness, color change, or temperature change in your toes. If you need to remove your compression garments, do so by unrolling them. Do not cut the compression garments off, this is to prevent cutting yourself on accident.    -Never walk around the house barefoot. Always wear a rubber soled slipper when walking around the house.    -Should you experience any significant changes in your wound(s), such as infection (redness, swelling, localized heat, increased pain, fever > 101 F, chills) or have any questions regarding your home care instructions, please contact the wound center at [phone]. If after hours, contact your primary care physician or go to the hospital emergency room.

## 2020-11-27 ENCOUNTER — APPOINTMENT (OUTPATIENT)
Dept: WOUND CARE | Facility: MEDICAL CENTER | Age: 55
End: 2020-11-27
Attending: FAMILY MEDICINE
Payer: COMMERCIAL

## 2021-01-05 RX ORDER — ATORVASTATIN CALCIUM 40 MG/1
TABLET, FILM COATED ORAL
Qty: 90 TAB | Refills: 0 | Status: SHIPPED | OUTPATIENT
Start: 2021-01-05 | End: 2021-04-12

## 2021-01-05 RX ORDER — ATENOLOL 50 MG/1
TABLET ORAL
Qty: 90 TAB | Refills: 0 | Status: SHIPPED | OUTPATIENT
Start: 2021-01-05 | End: 2021-04-09

## 2021-03-15 DIAGNOSIS — Z23 NEED FOR VACCINATION: ICD-10-CM

## 2021-04-09 RX ORDER — ATENOLOL 50 MG/1
TABLET ORAL
Qty: 90 TABLET | Refills: 0 | Status: SHIPPED | OUTPATIENT
Start: 2021-04-09 | End: 2021-07-13

## 2021-04-12 DIAGNOSIS — E78.00 PURE HYPERCHOLESTEROLEMIA: ICD-10-CM

## 2021-04-13 RX ORDER — ATORVASTATIN CALCIUM 40 MG/1
TABLET, FILM COATED ORAL
Qty: 90 TABLET | Refills: 3 | Status: SHIPPED | OUTPATIENT
Start: 2021-04-13 | End: 2022-05-13

## 2021-07-13 RX ORDER — ATENOLOL 50 MG/1
TABLET ORAL
Qty: 90 TABLET | Refills: 3 | Status: SHIPPED | OUTPATIENT
Start: 2021-07-13 | End: 2022-08-12

## 2021-07-16 RX ORDER — TRIAMTERENE AND HYDROCHLOROTHIAZIDE 37.5; 25 MG/1; MG/1
CAPSULE ORAL
Qty: 90 CAPSULE | Refills: 3 | Status: SHIPPED | OUTPATIENT
Start: 2021-07-16 | End: 2022-07-12

## 2021-07-27 RX ORDER — FENOFIBRATE 145 MG/1
TABLET, COATED ORAL
Qty: 90 TABLET | Refills: 0 | Status: SHIPPED | OUTPATIENT
Start: 2021-07-27 | End: 2021-11-03

## 2021-09-14 ENCOUNTER — HOSPITAL ENCOUNTER (EMERGENCY)
Facility: MEDICAL CENTER | Age: 56
End: 2021-09-14
Attending: EMERGENCY MEDICINE
Payer: COMMERCIAL

## 2021-09-14 VITALS
TEMPERATURE: 97.6 F | RESPIRATION RATE: 14 BRPM | WEIGHT: 185 LBS | OXYGEN SATURATION: 95 % | HEART RATE: 62 BPM | SYSTOLIC BLOOD PRESSURE: 150 MMHG | DIASTOLIC BLOOD PRESSURE: 75 MMHG | HEIGHT: 70 IN | BODY MASS INDEX: 26.48 KG/M2

## 2021-09-14 DIAGNOSIS — S51.812A LACERATION OF LEFT FOREARM, INITIAL ENCOUNTER: ICD-10-CM

## 2021-09-14 PROCEDURE — 304217 HCHG IRRIGATION SYSTEM

## 2021-09-14 PROCEDURE — 304999 HCHG REPAIR-SIMPLE/INTERMED LEVEL 1

## 2021-09-14 PROCEDURE — 305308 HCHG STAPLER,SKIN,DISP.

## 2021-09-14 PROCEDURE — 99283 EMERGENCY DEPT VISIT LOW MDM: CPT

## 2021-09-14 RX ORDER — LIDOCAINE HYDROCHLORIDE AND EPINEPHRINE BITARTRATE 20; .01 MG/ML; MG/ML
10 INJECTION, SOLUTION SUBCUTANEOUS ONCE
Status: DISCONTINUED | OUTPATIENT
Start: 2021-09-14 | End: 2021-09-14 | Stop reason: HOSPADM

## 2021-09-14 ASSESSMENT — FIBROSIS 4 INDEX: FIB4 SCORE: 1.44

## 2021-09-14 ASSESSMENT — PAIN DESCRIPTION - PAIN TYPE: TYPE: ACUTE PAIN

## 2021-09-14 NOTE — DISCHARGE INSTRUCTIONS
Staples out in 10-14 days.  Keep moist with antibiotic ointment, covered with dressing.  I recommend wearing that splint while staples are in to let things heal up.  Return for new symptoms or any turn for the worse.    Also, your blood pressures a little high today, make sure that you keep an eye on this and follow-up with your personal doctor.

## 2021-09-14 NOTE — ED TRIAGE NOTES
"Sony Tenorio  56 y.o. male  Chief Complaint   Patient presents with   • Open Wound     Approximate 4\" macerated laceration of medial left forearm from chainsaw accident       Pt ambulatory to triage with steady gait for above complaint.     Pt is alert and oriented, speaking in full sentences, follows commands and responds appropriately to questions. Resp are even and unlabored.     This RN masked and in appropriate PPE during encounter.   Dr. Guevara at bedside.     Vitals:    09/14/21 1132   BP: (!) 176/96   Pulse: (!) 57   Resp: 14   Temp: 36 °C (96.8 °F)   SpO2: 96%     "

## 2021-09-14 NOTE — ED NOTES
Patient provided discharge instructions. Patient verbalized understanding. Patient leaving ER in stable condition. Patient ambulatory with steady gait. Wristband  removed.

## 2021-09-14 NOTE — ED PROVIDER NOTES
"ED Provider Note    CHIEF COMPLAINT  Laceration    HPI  Sony Tenorio is a 56 y.o. male who presents with complaint of a laceration on the left forearm.  He was working cutting some branches at home, and the chainsaw got along the forearm.  He denies any brisk bleeding; it has been oozing.  Denies any weakness or numbness.  He did not fall or injure anything else.  There is no other complaint.  Last tetanus was when he hurt his leg in June of 2020.    PAST MEDICAL HISTORY  Past Medical History:   Diagnosis Date   • Hyperlipidemia    • Hypertension        SOCIAL HISTORY  Social History     Tobacco Use   • Smoking status: Never Smoker   • Smokeless tobacco: Former User   Vaping Use   • Vaping Use: Never used   Substance Use Topics   • Alcohol use: Yes     Alcohol/week: 2.4 oz     Types: 4 Cans of beer per week     Comment: currently not dringing while on antibotics   • Drug use: Yes     Types: Marijuana, Inhaled     Comment: occasionally, marijuana       CURRENT MEDICATIONS  Home Medications    **Home medications have not yet been reviewed for this encounter**         ALLERGIES  No Known Allergies    REVIEW OF SYSTEMS  See HPI for further details. Review of systems as above, laceration as described.    PHYSICAL EXAM  VITAL SIGNS: /75   Pulse 62   Temp 36 °C (96.8 °F) (Temporal)   Resp 14   Ht 1.778 m (5' 10\")   Wt 83.9 kg (185 lb)   SpO2 95%   BMI 26.54 kg/m²     Constitutional: Well appearing patient in no acute distress.  Not toxic, nor ill in appearance.  HENT: Mucus membranes moist.  Eyes: No scleral icterus.   Thorax & Lungs: Breathing easily on room air.  Skin: There is a macerated 8 cm laceration over the mid left forearm, volar medial aspect.  He was examined through all range of motion, he does get into the muscle belly.  There is a partial tear of one of the tendons, less than 50%, this is somewhat macerated as well.  Cardio: Heart is regular rate and rhythm.  Capillary refill in the fingers " is instantaneous.  Pulmonary: Lungs are clear to auscultation  Abdomen: soft without tenderness.  Neurologic: Distal strength and sensation intact.  Discriminate touch Is intact in the finger pads.  Median, ulnar and radial nerves are all intact in the hand.  He has excellent, intact strength with ulnar deviation as well as with wrist flexion  Psychiatric: Normal affect appropriate for the clinical situation.    LACERATION PROCEDURE NOTE  Verbal consent is obtained.  The skin is anesthetized using local infiltration of 2% lidocaine with epinephrine.  The wound is irrigated with copious normal saline.  It is explored, no foreign bodies are seen or felt.  Partial tendon injury as noted above.  The wound is prepped with Betadine and draped in the usual sterile fashion.  Devitalized wound edges are debrided sharply with scissors.  The muscle was closed with fascia four 5-0 Vicryl sutures.  Then the skin was closed with 7 staples.  Antibiotic ointment and dressing are applied.  Patient tolerated the procedure with no difficulty.    I have recommended follow up with personal doctor to recheck and follow blood pressure for any needed management    COURSE & MEDICAL DECISION MAKING  The patient is instructed to keep the wound moist with antibiotic ointment, covered with a dressing.  Staples out in 12-14 days.  Given aftercare instructions on laceration care and asked to return sooner for any concern at all.  I recommend wearing splint while staples are in.      FINAL IMPRESSION  1. Laceration of left forearm, initial encounter           This dictation was created using voice recognition software.    Electronically signed by: Jose Alberto Guevara M.D., 9/14/2021 11:26 AM

## 2021-09-28 ENCOUNTER — OFFICE VISIT (OUTPATIENT)
Dept: URGENT CARE | Facility: CLINIC | Age: 56
End: 2021-09-28
Payer: COMMERCIAL

## 2021-09-28 VITALS
TEMPERATURE: 98.5 F | BODY MASS INDEX: 26.77 KG/M2 | RESPIRATION RATE: 17 BRPM | OXYGEN SATURATION: 97 % | DIASTOLIC BLOOD PRESSURE: 78 MMHG | HEART RATE: 60 BPM | WEIGHT: 187 LBS | SYSTOLIC BLOOD PRESSURE: 142 MMHG | HEIGHT: 70 IN

## 2021-09-28 DIAGNOSIS — Z48.02 ENCOUNTER FOR STAPLE REMOVAL: ICD-10-CM

## 2021-09-28 PROCEDURE — 99212 OFFICE O/P EST SF 10 MIN: CPT | Performed by: NURSE PRACTITIONER

## 2021-09-28 ASSESSMENT — FIBROSIS 4 INDEX: FIB4 SCORE: 1.44

## 2021-09-28 ASSESSMENT — ENCOUNTER SYMPTOMS
FEVER: 0
CHILLS: 0
MYALGIAS: 0
NAUSEA: 0
VOMITING: 0

## 2021-09-28 NOTE — PROGRESS NOTES
Subjective:     Sony Tenorio is a 56 y.o. male who presents for Wound Check (left forearm laceration follow up check up  / 7 staples placed )      Wound Check      Pt presents for evaluation of a new problem.  Sony is a pleasant 56-year-old male presents to urgent care today for staple removal after sustaining a laceration to his left forearm with a chain saw 2 weeks ago.  He notes that he has been keeping his wound covered the entire time.  He is now experiencing mild erythema on 3 of the staples.  Negative for pain or changes in range of motion.  Negative for drainage, fevers or chills.    Review of Systems   Constitutional: Negative for chills and fever.   Gastrointestinal: Negative for nausea and vomiting.   Musculoskeletal: Negative for myalgias.       PMH:   Past Medical History:   Diagnosis Date   • Hyperlipidemia    • Hypertension      ALLERGIES: No Known Allergies  SURGHX: No past surgical history on file.  SOCHX:   Social History     Socioeconomic History   • Marital status:      Spouse name: Not on file   • Number of children: Not on file   • Years of education: Not on file   • Highest education level: Not on file   Occupational History   • Not on file   Tobacco Use   • Smoking status: Never Smoker   • Smokeless tobacco: Former User   Vaping Use   • Vaping Use: Never used   Substance and Sexual Activity   • Alcohol use: Yes     Alcohol/week: 2.4 oz     Types: 4 Cans of beer per week     Comment: currently not dringing while on antibotics   • Drug use: Yes     Types: Marijuana, Inhaled     Comment: occasionally, marijuana   • Sexual activity: Yes     Partners: Female   Other Topics Concern   • Not on file   Social History Narrative   • Not on file     Social Determinants of Health     Financial Resource Strain:    • Difficulty of Paying Living Expenses:    Food Insecurity:    • Worried About Running Out of Food in the Last Year:    • Ran Out of Food in the Last Year:    Transportation Needs:   "  • Lack of Transportation (Medical):    • Lack of Transportation (Non-Medical):    Physical Activity:    • Days of Exercise per Week:    • Minutes of Exercise per Session:    Stress:    • Feeling of Stress :    Social Connections:    • Frequency of Communication with Friends and Family:    • Frequency of Social Gatherings with Friends and Family:    • Attends [employer] Services:    • Active Member of Clubs or Organizations:    • Attends Club or Organization Meetings:    • Marital Status:    Intimate Partner Violence:    • Fear of Current or Ex-Partner:    • Emotionally Abused:    • Physically Abused:    • Sexually Abused:      FH: No family history on file.      Objective:   /78 (BP Location: Left arm, Patient Position: Sitting, BP Cuff Size: Adult)   Pulse 60   Temp 36.9 °C (98.5 °F) (Temporal)   Resp 17   Ht 1.778 m (5' 10\")   Wt 84.8 kg (187 lb)   SpO2 97%   BMI 26.83 kg/m²     Physical Exam  Vitals and nursing note reviewed.   Constitutional:       General: He is not in acute distress.     Appearance: Normal appearance. He is normal weight. He is not ill-appearing or toxic-appearing.   HENT:      Head: Normocephalic.      Right Ear: External ear normal.      Left Ear: External ear normal.      Nose: Nose normal.      Mouth/Throat:      Mouth: Mucous membranes are moist.   Eyes:      General:         Right eye: No discharge.         Left eye: No discharge.      Extraocular Movements: Extraocular movements intact.      Conjunctiva/sclera: Conjunctivae normal.      Pupils: Pupils are equal, round, and reactive to light.   Cardiovascular:      Rate and Rhythm: Normal rate and regular rhythm.   Pulmonary:      Effort: Pulmonary effort is normal.      Breath sounds: Normal breath sounds.   Abdominal:      General: Abdomen is flat.   Musculoskeletal:         General: Normal range of motion.      Cervical back: Normal range of motion and neck supple. No rigidity.   Lymphadenopathy:      Cervical: No " cervical adenopathy.   Skin:     General: Skin is warm and dry.      Comments: Laceration with 7 staples present to left forearm.. Incomplete approximation of wound present on 4 out of 7 staples.  3 staples on bilateral end of wound were removed without difficulty.   Neurological:      General: No focal deficit present.      Mental Status: He is alert and oriented to person, place, and time. Mental status is at baseline.   Psychiatric:         Mood and Affect: Mood normal.         Behavior: Behavior normal.         Judgment: Judgment normal.         Assessment/Plan:   Assessment    1. Encounter for staple removal         Patient to follow-up in 5 to 7 days.  Keep wound uncovered unless performing dirty task.  Apply Polysporin twice daily.  Seek care in urgent care sooner for worsening symptoms.  AVS handout given and reviewed with patient. Pt educated on red flags and when to seek treatment back in ER or UC.

## 2021-10-04 ENCOUNTER — OFFICE VISIT (OUTPATIENT)
Dept: URGENT CARE | Facility: CLINIC | Age: 56
End: 2021-10-04
Payer: COMMERCIAL

## 2021-10-04 VITALS
BODY MASS INDEX: 26.77 KG/M2 | HEIGHT: 70 IN | RESPIRATION RATE: 16 BRPM | HEART RATE: 54 BPM | SYSTOLIC BLOOD PRESSURE: 130 MMHG | TEMPERATURE: 97.1 F | DIASTOLIC BLOOD PRESSURE: 90 MMHG | WEIGHT: 187 LBS | OXYGEN SATURATION: 98 %

## 2021-10-04 DIAGNOSIS — Z48.02 ENCOUNTER FOR STAPLE REMOVAL: ICD-10-CM

## 2021-10-04 PROCEDURE — 99212 OFFICE O/P EST SF 10 MIN: CPT | Performed by: PHYSICIAN ASSISTANT

## 2021-10-04 ASSESSMENT — ENCOUNTER SYMPTOMS
SENSORY CHANGE: 0
FOCAL WEAKNESS: 0
TINGLING: 0
FEVER: 0
CHILLS: 0

## 2021-10-04 ASSESSMENT — FIBROSIS 4 INDEX: FIB4 SCORE: 1.44

## 2021-10-04 NOTE — PROGRESS NOTES
Subjective     Sony Tenorio is a 56 y.o. male who presents with Suture / Staple Removal (forearm left, need removal, 3-staples removed previous, leaving 4-staples making total 7)    HPI:  Sony Tenorio is a 56 y.o. male who presents today for evaluation of staple removal.  Patient was initially seen in the emergency department on 9/14/2021 after a laceration on his left forearm from a chainsaw while he was cutting some branches at home.  Patient had 7 staples placed to approximate his wound at that time.  He came to the urgent care 9/28/2021 for staple removal.  3 staples removed at that time but the wound was not well approximated and the other locations so 4 staples were left.  Patient is here today to get the remaining staples out.  He notes that he continues to have full range of motion of his left upper extremity without any numbness or tingling.  No discharge from the wound site.  Pain continues to be greatly improved.  No surrounding redness.  No fever/chills.      Review of Systems   Constitutional: Negative for chills and fever.   Skin:        Healing laceration of left forearm with staples in place   Neurological: Negative for tingling, sensory change and focal weakness.         PMH:  has a past medical history of Hyperlipidemia and Hypertension.  MEDS:   Current Outpatient Medications:   •  fenofibrate (TRICOR) 145 MG Tab, TAKE 1 TABLET BY MOUTH EVERYDAY AT BEDTIME, Disp: 90 tablet, Rfl: 0  •  triamterene/hctz (MAXZIDE-25/DYAZIDE) 37.5-25 MG Cap, TAKE 1 CAPSULE BY MOUTH EVERY DAY IN THE MORNING, Disp: 90 capsule, Rfl: 3  •  atenolol (TENORMIN) 50 MG Tab, TAKE 1 TABLET BY MOUTH EVERY DAY, Disp: 90 tablet, Rfl: 3  •  atorvastatin (LIPITOR) 40 MG Tab, TAKE 1 TABLET BY MOUTH EVERY DAY, Disp: 90 tablet, Rfl: 3  •  aspirin 81 MG EC tablet, TAKE 1 TABLET BY MOUTH EVERY DAY, Disp: 365 Tab, Rfl: 1  •  triamcinolone acetonide (KENALOG) 0.1 % Ointment, Apply 1 Application to affected area(s) 2 times a day.  "(Patient not taking: Reported on 9/28/2021), Disp: 30 g, Rfl: 0  •  ibuprofen (MOTRIN) 800 MG Tab, Take 1 Tab by mouth every 8 hours as needed for Moderate Pain or Inflammation. (Patient not taking: Reported on 7/7/2020), Disp: 30 Tab, Rfl: 0  •  carbamide peroxide (CARBAMOXIDE EAR DROPS) 6.5 % Solution, Place 6 Drops in ear 2 times a day. Administer drops in both ears., Disp: 1 Bottle, Rfl: 0  •  carbamide peroxide (CARBAMOXIDE EAR DROPS) 6.5 % Solution, Place 6 Drops in ear 2 times a day. Administer drops in both ears. (Patient not taking: Reported on 9/28/2021), Disp: 1 Bottle, Rfl: 0  ALLERGIES: No Known Allergies  SURGHX: No past surgical history on file.  SOCHX:  reports that he has never smoked. He quit smokeless tobacco use about 5 years ago. He reports current alcohol use of about 2.4 oz of alcohol per week. He reports current drug use. Drugs: Marijuana and Inhaled.  FH: Family history was reviewed, no pertinent findings to report      Objective     /90 (BP Location: Left arm, Patient Position: Sitting, BP Cuff Size: Adult)   Pulse (!) 54   Temp 36.2 °C (97.1 °F) (Temporal)   Resp 16   Ht 1.778 m (5' 10\")   Wt 84.8 kg (187 lb)   SpO2 98%   BMI 26.83 kg/m²      Physical Exam  Constitutional:       Appearance: He is well-developed.   HENT:      Head: Normocephalic and atraumatic.      Right Ear: External ear normal.      Left Ear: External ear normal.   Eyes:      Conjunctiva/sclera: Conjunctivae normal.      Pupils: Pupils are equal, round, and reactive to light.   Cardiovascular:      Rate and Rhythm: Normal rate and regular rhythm.      Heart sounds: Normal heart sounds. No murmur heard.     Pulmonary:      Effort: Pulmonary effort is normal.      Breath sounds: Normal breath sounds. No wheezing.   Musculoskeletal:      Cervical back: Normal range of motion.      Comments: Medial/volar aspect of left forearm exhibits a laceration that is approximately 8 cm in length.  There are 4 staples in " place and the wound appears to be well approximated.  No surrounding erythema.  Patient has full range of motion of the left upper extremity.  5/5  strength.  Distal neurovascular intact.  Cap refill less than 2 seconds.   Lymphadenopathy:      Cervical: No cervical adenopathy.   Skin:     General: Skin is warm and dry.      Capillary Refill: Capillary refill takes less than 2 seconds.   Neurological:      Mental Status: He is alert and oriented to person, place, and time.   Psychiatric:         Behavior: Behavior normal.         Judgment: Judgment normal.           Assessment & Plan     1. Encounter for staple removal    Staple removal: Area was cleansed with alcohol swab and then the remaining 4 staples were removed without difficulty.  The wound remained well approximated.  Continued wound care instructions discussed with patient.  Follow-up as needed.

## 2021-11-03 RX ORDER — FENOFIBRATE 145 MG/1
TABLET, COATED ORAL
Qty: 30 TABLET | Refills: 0 | Status: SHIPPED | OUTPATIENT
Start: 2021-11-03 | End: 2022-01-26 | Stop reason: SDUPTHER

## 2022-01-27 RX ORDER — FENOFIBRATE 145 MG/1
145 TABLET, COATED ORAL
Qty: 30 TABLET | Refills: 0 | Status: SHIPPED | OUTPATIENT
Start: 2022-01-27 | End: 2022-02-21

## 2022-02-21 RX ORDER — FENOFIBRATE 145 MG/1
TABLET, COATED ORAL
Qty: 30 TABLET | Refills: 0 | Status: SHIPPED | OUTPATIENT
Start: 2022-02-21 | End: 2022-03-24

## 2022-03-24 RX ORDER — FENOFIBRATE 145 MG/1
TABLET, COATED ORAL
Qty: 30 TABLET | Refills: 0 | Status: SHIPPED | OUTPATIENT
Start: 2022-03-24 | End: 2022-04-19

## 2022-03-24 NOTE — TELEPHONE ENCOUNTER
Received request via: Pharmacy    Was the patient seen in the last year in this department? Yes 10/24/21    Does the patient have an active prescription (recently filled or refills available) for medication(s) requested? No

## 2022-04-19 RX ORDER — FENOFIBRATE 145 MG/1
TABLET, COATED ORAL
Qty: 30 TABLET | Refills: 0 | Status: SHIPPED | OUTPATIENT
Start: 2022-04-19 | End: 2022-05-18

## 2022-04-19 NOTE — TELEPHONE ENCOUNTER
Received request via: Pharmacy    Was the patient seen in the last year in this department? No 7/7/20    Does the patient have an active prescription (recently filled or refills available) for medication(s) requested? No

## 2022-05-18 RX ORDER — FENOFIBRATE 145 MG/1
TABLET, COATED ORAL
Qty: 30 TABLET | Refills: 0 | Status: SHIPPED | OUTPATIENT
Start: 2022-05-18 | End: 2022-07-12

## 2022-06-15 DIAGNOSIS — E78.00 PURE HYPERCHOLESTEROLEMIA: ICD-10-CM

## 2022-06-15 NOTE — TELEPHONE ENCOUNTER
Received request via: Pharmacy    Was the patient seen in the last year in this department? Yes 10/4/21    Does the patient have an active prescription (recently filled or refills available) for medication(s) requested? No

## 2022-06-16 RX ORDER — ATORVASTATIN CALCIUM 40 MG/1
TABLET, FILM COATED ORAL
Qty: 32 TABLET | Refills: 0 | Status: SHIPPED | OUTPATIENT
Start: 2022-06-16 | End: 2022-07-12

## 2022-09-14 RX ORDER — ATENOLOL 50 MG/1
TABLET ORAL
Qty: 22 TABLET | Refills: 0 | Status: SHIPPED | OUTPATIENT
Start: 2022-09-14 | End: 2022-10-05

## 2022-10-05 RX ORDER — ATENOLOL 50 MG/1
TABLET ORAL
Qty: 22 TABLET | Refills: 0 | Status: SHIPPED | OUTPATIENT
Start: 2022-10-05 | End: 2023-02-10

## 2022-11-04 DIAGNOSIS — E78.00 PURE HYPERCHOLESTEROLEMIA: ICD-10-CM

## 2022-11-04 RX ORDER — ATORVASTATIN CALCIUM 40 MG/1
40 TABLET, FILM COATED ORAL
Qty: 90 TABLET | Refills: 1 | Status: SHIPPED | OUTPATIENT
Start: 2022-11-04 | End: 2023-02-10 | Stop reason: SDUPTHER

## 2022-12-07 RX ORDER — TRIAMTERENE AND HYDROCHLOROTHIAZIDE 37.5; 25 MG/1; MG/1
CAPSULE ORAL
Qty: 32 CAPSULE | Refills: 0 | Status: SHIPPED | OUTPATIENT
Start: 2022-12-07 | End: 2023-02-10

## 2022-12-07 NOTE — TELEPHONE ENCOUNTER
Received request via: Pharmacy    Was the patient seen in the last year in this department? No Has been over 2 years    Does the patient have an active prescription (recently filled or refills available) for medication(s) requested? No    Does the patient have detention Plus and need 100 day supply (blood pressure, diabetes and cholesterol meds only)? Patient does not have SCP

## 2022-12-18 SDOH — ECONOMIC STABILITY: HOUSING INSECURITY: IN THE LAST 12 MONTHS, HOW MANY PLACES HAVE YOU LIVED?: 1

## 2022-12-18 SDOH — ECONOMIC STABILITY: HOUSING INSECURITY
IN THE LAST 12 MONTHS, WAS THERE A TIME WHEN YOU DID NOT HAVE A STEADY PLACE TO SLEEP OR SLEPT IN A SHELTER (INCLUDING NOW)?: NO

## 2022-12-18 SDOH — ECONOMIC STABILITY: TRANSPORTATION INSECURITY
IN THE PAST 12 MONTHS, HAS LACK OF TRANSPORTATION KEPT YOU FROM MEETINGS, WORK, OR FROM GETTING THINGS NEEDED FOR DAILY LIVING?: PATIENT DECLINED

## 2022-12-18 SDOH — HEALTH STABILITY: MENTAL HEALTH
STRESS IS WHEN SOMEONE FEELS TENSE, NERVOUS, ANXIOUS, OR CAN'T SLEEP AT NIGHT BECAUSE THEIR MIND IS TROUBLED. HOW STRESSED ARE YOU?: NOT AT ALL

## 2022-12-18 SDOH — ECONOMIC STABILITY: TRANSPORTATION INSECURITY
IN THE PAST 12 MONTHS, HAS THE LACK OF TRANSPORTATION KEPT YOU FROM MEDICAL APPOINTMENTS OR FROM GETTING MEDICATIONS?: PATIENT DECLINED

## 2022-12-18 SDOH — ECONOMIC STABILITY: HOUSING INSECURITY
IN THE LAST 12 MONTHS, WAS THERE A TIME WHEN YOU DID NOT HAVE A STEADY PLACE TO SLEEP OR SLEPT IN A SHELTER (INCLUDING NOW)?: PATIENT REFUSED

## 2022-12-18 SDOH — ECONOMIC STABILITY: TRANSPORTATION INSECURITY
IN THE PAST 12 MONTHS, HAS LACK OF RELIABLE TRANSPORTATION KEPT YOU FROM MEDICAL APPOINTMENTS, MEETINGS, WORK OR FROM GETTING THINGS NEEDED FOR DAILY LIVING?: PATIENT DECLINED

## 2022-12-18 SDOH — ECONOMIC STABILITY: FOOD INSECURITY: WITHIN THE PAST 12 MONTHS, THE FOOD YOU BOUGHT JUST DIDN'T LAST AND YOU DIDN'T HAVE MONEY TO GET MORE.: PATIENT DECLINED

## 2022-12-18 SDOH — ECONOMIC STABILITY: FOOD INSECURITY: WITHIN THE PAST 12 MONTHS, YOU WORRIED THAT YOUR FOOD WOULD RUN OUT BEFORE YOU GOT MONEY TO BUY MORE.: PATIENT DECLINED

## 2022-12-18 SDOH — ECONOMIC STABILITY: INCOME INSECURITY: HOW HARD IS IT FOR YOU TO PAY FOR THE VERY BASICS LIKE FOOD, HOUSING, MEDICAL CARE, AND HEATING?: HARD

## 2022-12-18 SDOH — HEALTH STABILITY: PHYSICAL HEALTH: ON AVERAGE, HOW MANY DAYS PER WEEK DO YOU ENGAGE IN MODERATE TO STRENUOUS EXERCISE (LIKE A BRISK WALK)?: 5 DAYS

## 2022-12-18 SDOH — HEALTH STABILITY: PHYSICAL HEALTH: ON AVERAGE, HOW MANY MINUTES DO YOU ENGAGE IN EXERCISE AT THIS LEVEL?: 150+ MIN

## 2022-12-18 SDOH — ECONOMIC STABILITY: INCOME INSECURITY: IN THE LAST 12 MONTHS, WAS THERE A TIME WHEN YOU WERE NOT ABLE TO PAY THE MORTGAGE OR RENT ON TIME?: PATIENT REFUSED

## 2022-12-18 ASSESSMENT — SOCIAL DETERMINANTS OF HEALTH (SDOH)
HOW OFTEN DO YOU ATTENT MEETINGS OF THE CLUB OR ORGANIZATION YOU BELONG TO?: PATIENT DECLINED
HOW OFTEN DO YOU ATTEND CHURCH OR RELIGIOUS SERVICES?: PATIENT DECLINED
IN A TYPICAL WEEK, HOW MANY TIMES DO YOU TALK ON THE PHONE WITH FAMILY, FRIENDS, OR NEIGHBORS?: PATIENT DECLINED
HOW OFTEN DO YOU GET TOGETHER WITH FRIENDS OR RELATIVES?: PATIENT DECLINED
HOW OFTEN DO YOU ATTEND CHURCH OR RELIGIOUS SERVICES?: PATIENT DECLINED
HOW HARD IS IT FOR YOU TO PAY FOR THE VERY BASICS LIKE FOOD, HOUSING, MEDICAL CARE, AND HEATING?: HARD
HOW MANY DRINKS CONTAINING ALCOHOL DO YOU HAVE ON A TYPICAL DAY WHEN YOU ARE DRINKING: 3 OR 4
DO YOU BELONG TO ANY CLUBS OR ORGANIZATIONS SUCH AS CHURCH GROUPS UNIONS, FRATERNAL OR ATHLETIC GROUPS, OR SCHOOL GROUPS?: PATIENT DECLINED
IN A TYPICAL WEEK, HOW MANY TIMES DO YOU TALK ON THE PHONE WITH FAMILY, FRIENDS, OR NEIGHBORS?: PATIENT DECLINED
WITHIN THE PAST 12 MONTHS, YOU WORRIED THAT YOUR FOOD WOULD RUN OUT BEFORE YOU GOT THE MONEY TO BUY MORE: PATIENT DECLINED
HOW OFTEN DO YOU GET TOGETHER WITH FRIENDS OR RELATIVES?: PATIENT DECLINED
HOW OFTEN DO YOU HAVE SIX OR MORE DRINKS ON ONE OCCASION: WEEKLY
HOW OFTEN DO YOU ATTENT MEETINGS OF THE CLUB OR ORGANIZATION YOU BELONG TO?: PATIENT DECLINED
DO YOU BELONG TO ANY CLUBS OR ORGANIZATIONS SUCH AS CHURCH GROUPS UNIONS, FRATERNAL OR ATHLETIC GROUPS, OR SCHOOL GROUPS?: PATIENT DECLINED

## 2022-12-18 ASSESSMENT — LIFESTYLE VARIABLES
HOW OFTEN DO YOU HAVE SIX OR MORE DRINKS ON ONE OCCASION: WEEKLY
HOW MANY STANDARD DRINKS CONTAINING ALCOHOL DO YOU HAVE ON A TYPICAL DAY: 3 OR 4

## 2022-12-20 ENCOUNTER — HOSPITAL ENCOUNTER (OUTPATIENT)
Dept: LAB | Facility: MEDICAL CENTER | Age: 57
End: 2022-12-20
Attending: STUDENT IN AN ORGANIZED HEALTH CARE EDUCATION/TRAINING PROGRAM
Payer: COMMERCIAL

## 2022-12-20 ENCOUNTER — OFFICE VISIT (OUTPATIENT)
Dept: MEDICAL GROUP | Facility: CLINIC | Age: 57
End: 2022-12-20
Payer: COMMERCIAL

## 2022-12-20 DIAGNOSIS — I10 PRIMARY HYPERTENSION: ICD-10-CM

## 2022-12-20 DIAGNOSIS — E78.5 DYSLIPIDEMIA: ICD-10-CM

## 2022-12-20 DIAGNOSIS — Z13.79 GENETIC SCREENING: ICD-10-CM

## 2022-12-20 LAB
EST. AVERAGE GLUCOSE BLD GHB EST-MCNC: 117 MG/DL
HBA1C MFR BLD: 5.7 % (ref 4–5.6)

## 2022-12-20 PROCEDURE — 83036 HEMOGLOBIN GLYCOSYLATED A1C: CPT

## 2022-12-20 PROCEDURE — 80053 COMPREHEN METABOLIC PANEL: CPT

## 2022-12-20 PROCEDURE — 82570 ASSAY OF URINE CREATININE: CPT

## 2022-12-20 PROCEDURE — 99213 OFFICE O/P EST LOW 20 MIN: CPT | Performed by: STUDENT IN AN ORGANIZED HEALTH CARE EDUCATION/TRAINING PROGRAM

## 2022-12-20 PROCEDURE — 36415 COLL VENOUS BLD VENIPUNCTURE: CPT

## 2022-12-20 PROCEDURE — 80061 LIPID PANEL: CPT

## 2022-12-20 PROCEDURE — 82043 UR ALBUMIN QUANTITATIVE: CPT

## 2022-12-20 PROCEDURE — 84443 ASSAY THYROID STIM HORMONE: CPT

## 2022-12-20 ASSESSMENT — PATIENT HEALTH QUESTIONNAIRE - PHQ9: CLINICAL INTERPRETATION OF PHQ2 SCORE: 0
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
## 2022-12-20 NOTE — ASSESSMENT & PLAN NOTE
Chronic. Well controlled.   Blood pressure in office at goal. Patient started meds at 41 and has lost weight since then. Does not take blood pressure at home. Interested in stopping atenolol, he is very active and feels this could be affecting his activity.  Patient also been on diuretic therapy for hypertension since he was 41.  No recent labs to follow renal function or electrolytes.  Placed orders today.  Plan:  -Continue triamtrene/HCTZ  -Start atenolol 25mg daily for a week, then stop   -Follow-up on CMP, lipid panel, A1c, urine microalbumin, and TSH

## 2022-12-20 NOTE — ASSESSMENT & PLAN NOTE
Patient was referred to the [facility] project today in office.  We will follow on results.

## 2022-12-20 NOTE — ASSESSMENT & PLAN NOTE
Chronic. Well controlled.   History of HLD, currently well controlled. He is on a statin and fenofibrate.  Reviewed previous lipid panels and cholesterol has been at goal.  No signs of hypertriglyceridemia.  Shared decision making with patient to stopping fenofibrate.  Plan:  -Continue Lipitor 40  -Stop fenofibrate

## 2022-12-21 LAB
ALBUMIN SERPL BCP-MCNC: 4.6 G/DL (ref 3.2–4.9)
ALBUMIN/GLOB SERPL: 1.5 G/DL
ALP SERPL-CCNC: 50 U/L (ref 30–99)
ALT SERPL-CCNC: 125 U/L (ref 2–50)
ANION GAP SERPL CALC-SCNC: 9 MMOL/L (ref 7–16)
AST SERPL-CCNC: 66 U/L (ref 12–45)
BILIRUB SERPL-MCNC: 0.5 MG/DL (ref 0.1–1.5)
BUN SERPL-MCNC: 14 MG/DL (ref 8–22)
CALCIUM ALBUM COR SERPL-MCNC: 8.8 MG/DL (ref 8.5–10.5)
CALCIUM SERPL-MCNC: 9.3 MG/DL (ref 8.5–10.5)
CHLORIDE SERPL-SCNC: 105 MMOL/L (ref 96–112)
CHOLEST SERPL-MCNC: 191 MG/DL (ref 100–199)
CO2 SERPL-SCNC: 25 MMOL/L (ref 20–33)
CREAT SERPL-MCNC: 0.94 MG/DL (ref 0.5–1.4)
CREAT UR-MCNC: 84.92 MG/DL
FASTING STATUS PATIENT QL REPORTED: NORMAL
GFR SERPLBLD CREATININE-BSD FMLA CKD-EPI: 95 ML/MIN/1.73 M 2
GLOBULIN SER CALC-MCNC: 3 G/DL (ref 1.9–3.5)
GLUCOSE SERPL-MCNC: 113 MG/DL (ref 65–99)
HDLC SERPL-MCNC: 67 MG/DL
LDLC SERPL CALC-MCNC: 70 MG/DL
MICROALBUMIN UR-MCNC: <1.2 MG/DL
MICROALBUMIN/CREAT UR: NORMAL MG/G (ref 0–30)
POTASSIUM SERPL-SCNC: 5.1 MMOL/L (ref 3.6–5.5)
PROT SERPL-MCNC: 7.6 G/DL (ref 6–8.2)
SODIUM SERPL-SCNC: 139 MMOL/L (ref 135–145)
TRIGL SERPL-MCNC: 268 MG/DL (ref 0–149)
TSH SERPL DL<=0.005 MIU/L-ACNC: 2.31 UIU/ML (ref 0.38–5.33)

## 2022-12-22 DIAGNOSIS — R74.01 ELEVATED ALT MEASUREMENT: ICD-10-CM

## 2022-12-27 NOTE — RESULT ENCOUNTER NOTE
Patient notified of results, scheduled an appointment for patient. No further act is needed at this time

## 2022-12-30 ENCOUNTER — RESEARCH ENCOUNTER (OUTPATIENT)
Dept: CARDIOLOGY | Facility: MEDICAL CENTER | Age: 57
End: 2022-12-30
Attending: FAMILY MEDICINE
Payer: COMMERCIAL

## 2022-12-30 DIAGNOSIS — Z00.6 RESEARCH STUDY PATIENT: ICD-10-CM

## 2022-12-30 NOTE — RESEARCH NOTE
[facility] [place] [facility] enrollment complete. Saliva sample collected onsite. SHEPPARD Identified consented and enrolled.

## 2023-01-19 ENCOUNTER — HOSPITAL ENCOUNTER (OUTPATIENT)
Dept: RADIOLOGY | Facility: MEDICAL CENTER | Age: 58
End: 2023-01-19
Attending: STUDENT IN AN ORGANIZED HEALTH CARE EDUCATION/TRAINING PROGRAM
Payer: COMMERCIAL

## 2023-01-19 ENCOUNTER — HOSPITAL ENCOUNTER (OUTPATIENT)
Facility: MEDICAL CENTER | Age: 58
End: 2023-01-19
Attending: PATHOLOGY
Payer: COMMERCIAL

## 2023-01-19 ENCOUNTER — HOSPITAL ENCOUNTER (OUTPATIENT)
Dept: LAB | Facility: MEDICAL CENTER | Age: 58
End: 2023-01-19
Attending: STUDENT IN AN ORGANIZED HEALTH CARE EDUCATION/TRAINING PROGRAM
Payer: COMMERCIAL

## 2023-01-19 DIAGNOSIS — R74.01 ELEVATED ALT MEASUREMENT: ICD-10-CM

## 2023-01-19 LAB
FERRITIN SERPL-MCNC: 675 NG/ML (ref 22–322)
HBV CORE AB SERPL QL IA: NONREACTIVE
HBV SURFACE AB SERPL IA-ACNC: <3.5 MIU/ML (ref 0–10)
HBV SURFACE AG SER QL: NORMAL
HCV AB SER QL: NORMAL
IRON SATN MFR SERPL: 37 % (ref 15–55)
IRON SERPL-MCNC: 105 UG/DL (ref 50–180)
TIBC SERPL-MCNC: 281 UG/DL (ref 250–450)
UIBC SERPL-MCNC: 176 UG/DL (ref 110–370)

## 2023-01-19 PROCEDURE — 86803 HEPATITIS C AB TEST: CPT

## 2023-01-19 PROCEDURE — 82103 ALPHA-1-ANTITRYPSIN TOTAL: CPT

## 2023-01-19 PROCEDURE — 76705 ECHO EXAM OF ABDOMEN: CPT

## 2023-01-19 PROCEDURE — 86015 ACTIN ANTIBODY EACH: CPT

## 2023-01-19 PROCEDURE — 86706 HEP B SURFACE ANTIBODY: CPT

## 2023-01-19 PROCEDURE — 83540 ASSAY OF IRON: CPT

## 2023-01-19 PROCEDURE — 36415 COLL VENOUS BLD VENIPUNCTURE: CPT

## 2023-01-19 PROCEDURE — 83550 IRON BINDING TEST: CPT

## 2023-01-19 PROCEDURE — 86704 HEP B CORE ANTIBODY TOTAL: CPT

## 2023-01-19 PROCEDURE — 82728 ASSAY OF FERRITIN: CPT

## 2023-01-19 PROCEDURE — 87340 HEPATITIS B SURFACE AG IA: CPT

## 2023-01-20 LAB
A1AT SERPL-MCNC: 130 MG/DL (ref 90–200)
SMA IGG SER-ACNC: 6 UNITS (ref 0–19)

## 2023-01-23 DIAGNOSIS — Z00.6 RESEARCH STUDY PATIENT: ICD-10-CM

## 2023-01-28 LAB
ELF SCORE: 9.8
RELATIVE RISK: ABNORMAL
RISK GROUP: ABNORMAL
RISK: 23.6 %

## 2023-01-29 ENCOUNTER — PATIENT MESSAGE (OUTPATIENT)
Dept: MEDICAL GROUP | Facility: CLINIC | Age: 58
End: 2023-01-29
Payer: COMMERCIAL

## 2023-02-10 ENCOUNTER — OFFICE VISIT (OUTPATIENT)
Dept: MEDICAL GROUP | Facility: CLINIC | Age: 58
End: 2023-02-10
Payer: COMMERCIAL

## 2023-02-10 DIAGNOSIS — R79.89 ELEVATED LFTS: ICD-10-CM

## 2023-02-10 DIAGNOSIS — E78.00 PURE HYPERCHOLESTEROLEMIA: ICD-10-CM

## 2023-02-10 DIAGNOSIS — I10 PRIMARY HYPERTENSION: ICD-10-CM

## 2023-02-10 PROCEDURE — 99214 OFFICE O/P EST MOD 30 MIN: CPT | Mod: GC | Performed by: STUDENT IN AN ORGANIZED HEALTH CARE EDUCATION/TRAINING PROGRAM

## 2023-02-10 RX ORDER — ATORVASTATIN CALCIUM 40 MG/1
40 TABLET, FILM COATED ORAL
Qty: 90 TABLET | Refills: 1 | Status: SHIPPED | OUTPATIENT
Start: 2023-02-10 | End: 2023-02-21 | Stop reason: SDUPTHER

## 2023-02-10 ASSESSMENT — PATIENT HEALTH QUESTIONNAIRE - PHQ9: CLINICAL INTERPRETATION OF PHQ2 SCORE: 0

## 2023-02-11 NOTE — ASSESSMENT & PLAN NOTE
Patient was previously on fenofibrate and a statin.  His recent lipid panel was largely normal, so stopped the fenofibrate and continue the statin.  Patient asked for refill of the statin today.  Refill provided.

## 2023-02-11 NOTE — ASSESSMENT & PLAN NOTE
Acute.  Stable.  Patient noted to have elevated LFTs at last visit.  ALT was more elevated than AST.  Patient has been asymptomatic and has had no symptoms of abdominal pain, jaundice, itching, abdominal distention, or/chills, or any other concerning symptoms.  Did a liver work-up including alpha antitrypsin deficiency, hereditary hemochromatosis, autoimmune liver disease, hepatitis panel, liver ultrasound, which was all normal.  Patient stated that he stopped drinking today and previously had a heavy drinking history including around 8 beers per night.  He states he currently feels a lot better after alcohol cessation.  The LFT pattern in the labs was not obvious for alcohol liver disease, but praise was provided that he was able to stop drinking.  Plan:  Repeat LFTs ordered today  Ferritin was noted to be elevated, so repeated today as well with other inflammatory markers  We will devise a plan for follow-up once labs result

## 2023-02-11 NOTE — ASSESSMENT & PLAN NOTE
Chronic.  Managed with lifestyle.  Patient recently had some weight loss and improved his diet.  He states that his blood pressures been at goal without taking medications.  Patient asked if he could continue to not take medications for blood pressure.  Advised patient that this is okay as long as he continues to take his blood pressure at home with a goal of less than 140/90.  Patient advised that if his blood pressure is consistently over 140/90, that he might need a blood pressure medication and he should present to clinic to discuss

## 2023-02-11 NOTE — PROGRESS NOTES
[facility]    Chief Complaint   Patient presents with    Follow-Up     Medication refill        HISTORY OF PRESENT ILLNESS: Patient is a 58 y.o. male established patient who presents today to discuss the medical issues below.    Problem   Elevated Lfts   Pure Hypercholesterolemia   Primary Hypertension       Patient Active Problem List    Diagnosis Date Noted    Elevated LFTs 02/10/2023    Dyslipidemia 12/20/2022    Genetic screening 12/20/2022    Open wound 07/17/2020    Injury of calf 07/07/2020    Screening for colon cancer 10/12/2018    Bilateral impacted cerumen 10/12/2018    Pure hypercholesterolemia 04/12/2018    Primary hypertension 04/12/2018    Preventative health care 04/12/2018       Allergies:Patient has no known allergies.    Current Outpatient Medications   Medication Sig Dispense Refill    atorvastatin (LIPITOR) 40 MG Tab Take 1 Tablet by mouth every day. 90 Tablet 1    aspirin 81 MG EC tablet TAKE 1 TABLET BY MOUTH EVERY  Tab 1     No current facility-administered medications for this visit.         Past Medical History:   Diagnosis Date    Hyperlipidemia     Hypertension        Social History     Tobacco Use    Smoking status: Never    Smokeless tobacco: Former     Quit date: 2016   Vaping Use    Vaping Use: Never used   Substance Use Topics    Alcohol use: Yes     Alcohol/week: 2.4 oz     Types: 4 Cans of beer per week     Comment: currently not dringing while on antibotics    Drug use: Yes     Types: Marijuana, Inhaled     Comment: occasionally, marijuana       No family status information on file.   No family history on file.    ROS:  Negative except as stated above.       Exam:    Vitals:    02/10/23 1305   BP: (P) 128/80   Pulse: (P) 68   Temp: (P) 36.4 °C (97.6 °F)   Body mass index is 27.65 kg/m² (pended).  General:  Well nourished, well developed male in NAD.  HENT: Normocephalic, bilateral TMs are intact, nasal and oral mucosa with no lesions,   Neck: Supple without bruit.  Thyroid is not enlarged, no nodules palpated.  Pulmonary: Clear to ausculation.  Normal effort. No rales, rhonchi, or wheezing.  Cardiovascular: Regular rate and rhythm without murmur.   Abdomen: Normal bowel sounds, soft and nontender, no palpable liver, spleen, or masses.  Extremities: No LE edema noted. 5/5 strength in all extremities  Neuro: Grossly nonfocal.  Psych: Alert and oriented to person, place, and time. Appropriate mood and conversation.              Assessment/Plan:    1. Elevated LFTs  Comp Metabolic Panel    FERRITIN    Sed Rate    CRP QUANTITIVE (NON-CARDIAC)      2. Pure hypercholesterolemia  atorvastatin (LIPITOR) 40 MG Tab    HEMOGLOBIN A1C      3. Primary hypertension            Orders Placed This Encounter    Comp Metabolic Panel    FERRITIN    Sed Rate    CRP QUANTITIVE (NON-CARDIAC)    HEMOGLOBIN A1C    atorvastatin (LIPITOR) 40 MG Tab       Elevated LFTs  Acute.  Stable.  Patient noted to have elevated LFTs at last visit.  ALT was more elevated than AST.  Patient has been asymptomatic and has had no symptoms of abdominal pain, jaundice, itching, abdominal distention, or/chills, or any other concerning symptoms.  Did a liver work-up including alpha antitrypsin deficiency, hereditary hemochromatosis, autoimmune liver disease, hepatitis panel, liver ultrasound, which was all normal.  Patient stated that he stopped drinking today and previously had a heavy drinking history including around 8 beers per night.  He states he currently feels a lot better after alcohol cessation.  The LFT pattern in the labs was not obvious for alcohol liver disease, but praise was provided that he was able to stop drinking.  Plan:  Repeat LFTs ordered today  Ferritin was noted to be elevated, so repeated today as well with other inflammatory markers  We will devise a plan for follow-up once labs result    Pure hypercholesterolemia  Patient was previously on fenofibrate and a statin.  His recent lipid panel was  largely normal, so stopped the fenofibrate and continue the statin.  Patient asked for refill of the statin today.  Refill provided.    Primary hypertension  Chronic.  Managed with lifestyle.  Patient recently had some weight loss and improved his diet.  He states that his blood pressures been at goal without taking medications.  Patient asked if he could continue to not take medications for blood pressure.  Advised patient that this is okay as long as he continues to take his blood pressure at home with a goal of less than 140/90.  Patient advised that if his blood pressure is consistently over 140/90, that he might need a blood pressure medication and he should present to clinic to discuss      Followup: Return in about 3 months (around 5/10/2023).     Jeramy Keen MD   [facility]   PGY-3

## 2023-02-12 LAB
APOB+LDLR+PCSK9 GENE MUT ANL BLD/T: NOT DETECTED
BRCA1+BRCA2 DEL+DUP + FULL MUT ANL BLD/T: NOT DETECTED
MLH1+MSH2+MSH6+PMS2 GN DEL+DUP+FUL M: NOT DETECTED

## 2023-02-17 ENCOUNTER — HOSPITAL ENCOUNTER (OUTPATIENT)
Dept: LAB | Facility: MEDICAL CENTER | Age: 58
End: 2023-02-17
Attending: STUDENT IN AN ORGANIZED HEALTH CARE EDUCATION/TRAINING PROGRAM
Payer: COMMERCIAL

## 2023-02-17 DIAGNOSIS — R79.89 ELEVATED LFTS: ICD-10-CM

## 2023-02-17 DIAGNOSIS — E78.00 PURE HYPERCHOLESTEROLEMIA: ICD-10-CM

## 2023-02-17 LAB
ALBUMIN SERPL BCP-MCNC: 4.4 G/DL (ref 3.2–4.9)
ALBUMIN/GLOB SERPL: 1.6 G/DL
ALP SERPL-CCNC: 62 U/L (ref 30–99)
ALT SERPL-CCNC: 62 U/L (ref 2–50)
ANION GAP SERPL CALC-SCNC: 7 MMOL/L (ref 7–16)
AST SERPL-CCNC: 33 U/L (ref 12–45)
BILIRUB SERPL-MCNC: 0.4 MG/DL (ref 0.1–1.5)
BUN SERPL-MCNC: 15 MG/DL (ref 8–22)
CALCIUM ALBUM COR SERPL-MCNC: 9.2 MG/DL (ref 8.5–10.5)
CALCIUM SERPL-MCNC: 9.5 MG/DL (ref 8.5–10.5)
CHLORIDE SERPL-SCNC: 108 MMOL/L (ref 96–112)
CO2 SERPL-SCNC: 27 MMOL/L (ref 20–33)
CREAT SERPL-MCNC: 0.96 MG/DL (ref 0.5–1.4)
CRP SERPL HS-MCNC: <0.3 MG/DL (ref 0–0.75)
EST. AVERAGE GLUCOSE BLD GHB EST-MCNC: 123 MG/DL
FERRITIN SERPL-MCNC: 592 NG/ML (ref 22–322)
GFR SERPLBLD CREATININE-BSD FMLA CKD-EPI: 92 ML/MIN/1.73 M 2
GLOBULIN SER CALC-MCNC: 2.8 G/DL (ref 1.9–3.5)
GLUCOSE SERPL-MCNC: 116 MG/DL (ref 65–99)
HBA1C MFR BLD: 5.9 % (ref 4–5.6)
POTASSIUM SERPL-SCNC: 4.9 MMOL/L (ref 3.6–5.5)
PROT SERPL-MCNC: 7.2 G/DL (ref 6–8.2)
SODIUM SERPL-SCNC: 142 MMOL/L (ref 135–145)

## 2023-02-17 PROCEDURE — 80053 COMPREHEN METABOLIC PANEL: CPT

## 2023-02-17 PROCEDURE — 83036 HEMOGLOBIN GLYCOSYLATED A1C: CPT

## 2023-02-17 PROCEDURE — 36415 COLL VENOUS BLD VENIPUNCTURE: CPT

## 2023-02-17 PROCEDURE — 85652 RBC SED RATE AUTOMATED: CPT

## 2023-02-17 PROCEDURE — 86140 C-REACTIVE PROTEIN: CPT

## 2023-02-17 PROCEDURE — 82728 ASSAY OF FERRITIN: CPT

## 2023-02-18 LAB — ERYTHROCYTE [SEDIMENTATION RATE] IN BLOOD BY WESTERGREN METHOD: 7 MM/HOUR (ref 0–20)

## 2023-10-17 ENCOUNTER — OFFICE VISIT (OUTPATIENT)
Dept: MEDICAL GROUP | Facility: CLINIC | Age: 58
End: 2023-10-17
Payer: COMMERCIAL

## 2023-10-17 VITALS
DIASTOLIC BLOOD PRESSURE: 88 MMHG | HEIGHT: 71 IN | WEIGHT: 191 LBS | SYSTOLIC BLOOD PRESSURE: 158 MMHG | TEMPERATURE: 97.2 F | BODY MASS INDEX: 26.74 KG/M2 | OXYGEN SATURATION: 98 % | HEART RATE: 56 BPM

## 2023-10-17 DIAGNOSIS — I10 PRIMARY HYPERTENSION: Primary | ICD-10-CM

## 2023-10-17 DIAGNOSIS — Z72.0 TOBACCO CHEW USE: ICD-10-CM

## 2023-10-17 DIAGNOSIS — R73.03 PREDIABETES: ICD-10-CM

## 2023-10-17 DIAGNOSIS — E78.5 DYSLIPIDEMIA: ICD-10-CM

## 2023-10-17 DIAGNOSIS — R79.89 ELEVATED FERRITIN LEVEL: ICD-10-CM

## 2023-10-17 PROCEDURE — 99214 OFFICE O/P EST MOD 30 MIN: CPT | Performed by: FAMILY MEDICINE

## 2023-10-17 PROCEDURE — 3077F SYST BP >= 140 MM HG: CPT | Performed by: FAMILY MEDICINE

## 2023-10-17 PROCEDURE — 3079F DIAST BP 80-89 MM HG: CPT | Performed by: FAMILY MEDICINE

## 2023-10-17 ASSESSMENT — ENCOUNTER SYMPTOMS
SHORTNESS OF BREATH: 0
VOMITING: 0
FEVER: 0
BLOOD IN STOOL: 0
CHILLS: 0
LOSS OF CONSCIOUSNESS: 0
NAUSEA: 0
WHEEZING: 0
HEADACHES: 0
COUGH: 0
PALPITATIONS: 0
DIZZINESS: 0

## 2023-10-17 NOTE — PROGRESS NOTES
Subjective:     Chief Complaint   Patient presents with    Annual Exam       HPI: Sony is a 58 y.o. male who presents today for the following problems:    Here today for follow-up regarding hypertension and hyperlipidemia.  He was previously taken off his blood pressure medications and has been trying lifestyle modifications at home.  He has stopped eating fast foods.  He is eating home-cooked meals.  He has limited coffee intake to 1 cup/day.  He also exercises very regularly.  He takes his blood pressures at home and they are variable as below.  He would like to avoid hypertension medication if possible.  At home he does use a wrist blood pressure cuff.    BP readings at home are variable. Sometimes as low as 100/70.    Usual readings in 130-140's SBP.    Last colonosocpy at age 51, 10 years previously. No concerns reported. Not at [facility].    Deneis tobacco smoking. Quit using chewing tobacco 8 years previously.    Denies alcohol use since December of 2022.    Not interested in flu shot.    No problems updated.    Current Outpatient Medications   Medication Sig Dispense Refill    atorvastatin (LIPITOR) 40 MG Tab Take 1 Tablet by mouth every day. 90 Tablet 3    aspirin 81 MG EC tablet TAKE 1 TABLET BY MOUTH EVERY  Tab 1     No current facility-administered medications for this visit.       Social History     Tobacco Use    Smoking status: Never    Smokeless tobacco: Former     Quit date: 2016   Vaping Use    Vaping Use: Never used   Substance Use Topics    Alcohol use: Not Currently     Alcohol/week: 2.4 oz     Types: 4 Cans of beer per week     Comment: currently not dringing while on antibotics    Drug use: Not Currently     Types: Marijuana, Inhaled     Comment: occasionally, marijuana        Review of Systems   Constitutional:  Negative for chills and fever.   Respiratory:  Negative for cough, shortness of breath and wheezing.    Cardiovascular:  Negative for chest pain and palpitations.  "  Gastrointestinal:  Negative for blood in stool, melena, nausea and vomiting.   Genitourinary:  Negative for dysuria, hematuria and urgency.   Neurological:  Negative for dizziness, loss of consciousness and headaches.       Objective:     Vitals:    10/17/23 1113   BP: (!) 158/88   BP Location: Left arm   Patient Position: Sitting   Pulse: (!) 56   Temp: 36.2 °C (97.2 °F)   TempSrc: Temporal   SpO2: 98%   Weight: 86.6 kg (191 lb)   Height: 1.803 m (5' 11\")     Body mass index is 26.64 kg/m².     Physical Exam:  Gen: Well developed, well nourished in no acute distress.   Skin: Pink, warm, and dry  HEENT: conjunctiva non-injected  Neck: Supple  Ext: no edema  Alert and oriented Eye contact is good, speech goal directed, affect calm  Gait: not antalgic  Cardiovascular: No carotid bruits, regular rate and rhythm, no murmurs gallops or rubs  Lungs: Clear to auscultation bilaterally, no wheezes, rales, rhonchi    Assessment & Plan:   No problem-specific Assessment & Plan notes found for this encounter.    1. Primary hypertension  It's possible that his home BP readings are inconsistent due to the wrist BP cuff. Will trial arm BP cuff and home BP log and will reevaluate in 4 weeks. If still elevated, will start HTN medications. Most likely lisinopril. Labs ordered as below. Counseled DASH diet, low sodium and caffeine.  - CBC WITH DIFFERENTIAL; Future  - Lipid Profile; Future  - Comp Metabolic Panel; Future    2. Dyslipidemia  Counseled continuation of healthy diet. Will order repeat labs and adjust statin when results return.  - Lipid Profile; Future  - HEMOGLOBIN A1C; Future    3. Tobacco chew use  No longer using.    4. Elevated ferritin level  Will obtain repeat study.  - FERRITIN; Future    5. Prediabetes  Has been very active and eating healthy, will follow-up result to determine if intervention is needed.     Followup: Return in about 4 weeks (around 11/14/2023), or if symptoms worsen or fail to " improve.    Timothy Duenas M.D.    Please note that this dictation was created using voice recognition software. I have made every reasonable attempt to correct obvious errors, but I expect that there are errors of grammar and possibly content that I did not discover before finalizing the note.

## 2023-10-23 ENCOUNTER — HOSPITAL ENCOUNTER (OUTPATIENT)
Dept: LAB | Facility: MEDICAL CENTER | Age: 58
End: 2023-10-23
Attending: FAMILY MEDICINE
Payer: COMMERCIAL

## 2023-10-23 DIAGNOSIS — E78.5 DYSLIPIDEMIA: ICD-10-CM

## 2023-10-23 DIAGNOSIS — I10 PRIMARY HYPERTENSION: ICD-10-CM

## 2023-10-23 LAB
ALBUMIN SERPL BCP-MCNC: 4.1 G/DL (ref 3.2–4.9)
ALBUMIN/GLOB SERPL: 1.5 G/DL
ALP SERPL-CCNC: 56 U/L (ref 30–99)
ALT SERPL-CCNC: 34 U/L (ref 2–50)
ANION GAP SERPL CALC-SCNC: 12 MMOL/L (ref 7–16)
AST SERPL-CCNC: 34 U/L (ref 12–45)
BILIRUB SERPL-MCNC: 0.4 MG/DL (ref 0.1–1.5)
BUN SERPL-MCNC: 16 MG/DL (ref 8–22)
CALCIUM ALBUM COR SERPL-MCNC: 8.6 MG/DL (ref 8.5–10.5)
CALCIUM SERPL-MCNC: 8.7 MG/DL (ref 8.5–10.5)
CHLORIDE SERPL-SCNC: 109 MMOL/L (ref 96–112)
CHOLEST SERPL-MCNC: 150 MG/DL (ref 100–199)
CO2 SERPL-SCNC: 20 MMOL/L (ref 20–33)
CREAT SERPL-MCNC: 0.89 MG/DL (ref 0.5–1.4)
FASTING STATUS PATIENT QL REPORTED: NORMAL
GFR SERPLBLD CREATININE-BSD FMLA CKD-EPI: 100 ML/MIN/1.73 M 2
GLOBULIN SER CALC-MCNC: 2.7 G/DL (ref 1.9–3.5)
GLUCOSE SERPL-MCNC: 110 MG/DL (ref 65–99)
HDLC SERPL-MCNC: 62 MG/DL
LDLC SERPL CALC-MCNC: 69 MG/DL
POTASSIUM SERPL-SCNC: 4.6 MMOL/L (ref 3.6–5.5)
PROT SERPL-MCNC: 6.8 G/DL (ref 6–8.2)
SODIUM SERPL-SCNC: 141 MMOL/L (ref 135–145)
TRIGL SERPL-MCNC: 97 MG/DL (ref 0–149)

## 2023-10-23 PROCEDURE — 80061 LIPID PANEL: CPT

## 2023-10-23 PROCEDURE — 80053 COMPREHEN METABOLIC PANEL: CPT

## 2023-10-23 PROCEDURE — 36415 COLL VENOUS BLD VENIPUNCTURE: CPT
